# Patient Record
Sex: FEMALE | Race: WHITE | Employment: FULL TIME | URBAN - METROPOLITAN AREA
[De-identification: names, ages, dates, MRNs, and addresses within clinical notes are randomized per-mention and may not be internally consistent; named-entity substitution may affect disease eponyms.]

---

## 2017-06-13 ENCOUNTER — ANESTHESIA EVENT (OUTPATIENT)
Dept: SURGERY | Age: 27
End: 2017-06-13
Payer: SELF-PAY

## 2017-06-14 ENCOUNTER — HOSPITAL ENCOUNTER (OUTPATIENT)
Age: 27
Setting detail: OUTPATIENT SURGERY
Discharge: HOME | End: 2017-06-14
Attending: OTOLARYNGOLOGY | Admitting: OTOLARYNGOLOGY
Payer: SELF-PAY

## 2017-06-14 ENCOUNTER — ANESTHESIA (OUTPATIENT)
Dept: SURGERY | Age: 27
End: 2017-06-14
Payer: SELF-PAY

## 2017-06-14 VITALS
RESPIRATION RATE: 9 BRPM | HEART RATE: 78 BPM | TEMPERATURE: 97.3 F | BODY MASS INDEX: 19.56 KG/M2 | DIASTOLIC BLOOD PRESSURE: 84 MMHG | HEIGHT: 66 IN | SYSTOLIC BLOOD PRESSURE: 131 MMHG | OXYGEN SATURATION: 99 % | WEIGHT: 121.69 LBS

## 2017-06-14 LAB — HCG SERPL QL: NEGATIVE

## 2017-06-14 PROCEDURE — 76010000133 HC OR TIME 3 TO 3.5 HR: Performed by: OTOLARYNGOLOGY

## 2017-06-14 PROCEDURE — 77030002966 HC SUT PDS J&J -A: Performed by: OTOLARYNGOLOGY

## 2017-06-14 PROCEDURE — 76210000006 HC OR PH I REC 0.5 TO 1 HR: Performed by: OTOLARYNGOLOGY

## 2017-06-14 PROCEDURE — 76060000037 HC ANESTHESIA 3 TO 3.5 HR: Performed by: OTOLARYNGOLOGY

## 2017-06-14 PROCEDURE — 74011250636 HC RX REV CODE- 250/636: Performed by: ANESTHESIOLOGY

## 2017-06-14 PROCEDURE — 36415 COLL VENOUS BLD VENIPUNCTURE: CPT | Performed by: OTOLARYNGOLOGY

## 2017-06-14 PROCEDURE — 74011000250 HC RX REV CODE- 250

## 2017-06-14 PROCEDURE — 77030008771 HC TU NG SALEM SUMP -A: Performed by: ANESTHESIOLOGY

## 2017-06-14 PROCEDURE — 77030020782 HC GWN BAIR PAWS FLX 3M -B

## 2017-06-14 PROCEDURE — 74011250636 HC RX REV CODE- 250/636

## 2017-06-14 PROCEDURE — 77030018566 HC SUT PDS1 J&J -A: Performed by: OTOLARYNGOLOGY

## 2017-06-14 PROCEDURE — 77030026438 HC STYL ET INTUB CARD -A: Performed by: ANESTHESIOLOGY

## 2017-06-14 PROCEDURE — 77030008684 HC TU ET CUF COVD -B: Performed by: ANESTHESIOLOGY

## 2017-06-14 PROCEDURE — 74011250636 HC RX REV CODE- 250/636: Performed by: OTOLARYNGOLOGY

## 2017-06-14 PROCEDURE — 77030032490 HC SLV COMPR SCD KNE COVD -B: Performed by: OTOLARYNGOLOGY

## 2017-06-14 PROCEDURE — 84703 CHORIONIC GONADOTROPIN ASSAY: CPT | Performed by: OTOLARYNGOLOGY

## 2017-06-14 PROCEDURE — 77030002974 HC SUT PLN J&J -A: Performed by: OTOLARYNGOLOGY

## 2017-06-14 PROCEDURE — 76210000021 HC REC RM PH II 0.5 TO 1 HR: Performed by: OTOLARYNGOLOGY

## 2017-06-14 PROCEDURE — 74011000250 HC RX REV CODE- 250: Performed by: OTOLARYNGOLOGY

## 2017-06-14 PROCEDURE — 74011250637 HC RX REV CODE- 250/637: Performed by: OTOLARYNGOLOGY

## 2017-06-14 PROCEDURE — 77030020256 HC SOL INJ NACL 0.9%  500ML: Performed by: OTOLARYNGOLOGY

## 2017-06-14 RX ORDER — ALBUTEROL SULFATE 0.83 MG/ML
2.5 SOLUTION RESPIRATORY (INHALATION) AS NEEDED
Status: DISCONTINUED | OUTPATIENT
Start: 2017-06-14 | End: 2017-06-14 | Stop reason: HOSPADM

## 2017-06-14 RX ORDER — CEFAZOLIN SODIUM IN 0.9 % NACL 2 G/50 ML
2 INTRAVENOUS SOLUTION, PIGGYBACK (ML) INTRAVENOUS ONCE
Status: COMPLETED | OUTPATIENT
Start: 2017-06-14 | End: 2017-06-14

## 2017-06-14 RX ORDER — FENTANYL CITRATE 50 UG/ML
50 INJECTION, SOLUTION INTRAMUSCULAR; INTRAVENOUS AS NEEDED
Status: DISCONTINUED | OUTPATIENT
Start: 2017-06-14 | End: 2017-06-14 | Stop reason: HOSPADM

## 2017-06-14 RX ORDER — FENTANYL CITRATE 50 UG/ML
INJECTION, SOLUTION INTRAMUSCULAR; INTRAVENOUS AS NEEDED
Status: DISCONTINUED | OUTPATIENT
Start: 2017-06-14 | End: 2017-06-14 | Stop reason: HOSPADM

## 2017-06-14 RX ORDER — PROPOFOL 10 MG/ML
INJECTION, EMULSION INTRAVENOUS AS NEEDED
Status: DISCONTINUED | OUTPATIENT
Start: 2017-06-14 | End: 2017-06-14 | Stop reason: HOSPADM

## 2017-06-14 RX ORDER — MIDAZOLAM HYDROCHLORIDE 1 MG/ML
INJECTION, SOLUTION INTRAMUSCULAR; INTRAVENOUS AS NEEDED
Status: DISCONTINUED | OUTPATIENT
Start: 2017-06-14 | End: 2017-06-14 | Stop reason: HOSPADM

## 2017-06-14 RX ORDER — OXYMETAZOLINE HCL 0.05 %
SPRAY, NON-AEROSOL (ML) NASAL AS NEEDED
Status: DISCONTINUED | OUTPATIENT
Start: 2017-06-14 | End: 2017-06-14 | Stop reason: HOSPADM

## 2017-06-14 RX ORDER — ONDANSETRON 2 MG/ML
4 INJECTION INTRAMUSCULAR; INTRAVENOUS AS NEEDED
Status: DISCONTINUED | OUTPATIENT
Start: 2017-06-14 | End: 2017-06-14 | Stop reason: HOSPADM

## 2017-06-14 RX ORDER — SODIUM CHLORIDE, SODIUM LACTATE, POTASSIUM CHLORIDE, CALCIUM CHLORIDE 600; 310; 30; 20 MG/100ML; MG/100ML; MG/100ML; MG/100ML
150 INJECTION, SOLUTION INTRAVENOUS CONTINUOUS
Status: DISCONTINUED | OUTPATIENT
Start: 2017-06-14 | End: 2017-06-14 | Stop reason: HOSPADM

## 2017-06-14 RX ORDER — HYDROMORPHONE HYDROCHLORIDE 1 MG/ML
1 INJECTION, SOLUTION INTRAMUSCULAR; INTRAVENOUS; SUBCUTANEOUS
Status: DISCONTINUED | OUTPATIENT
Start: 2017-06-14 | End: 2017-06-14 | Stop reason: HOSPADM

## 2017-06-14 RX ORDER — DIPHENHYDRAMINE HYDROCHLORIDE 50 MG/ML
12.5 INJECTION, SOLUTION INTRAMUSCULAR; INTRAVENOUS AS NEEDED
Status: DISCONTINUED | OUTPATIENT
Start: 2017-06-14 | End: 2017-06-14 | Stop reason: HOSPADM

## 2017-06-14 RX ORDER — ROCURONIUM BROMIDE 10 MG/ML
INJECTION, SOLUTION INTRAVENOUS AS NEEDED
Status: DISCONTINUED | OUTPATIENT
Start: 2017-06-14 | End: 2017-06-14 | Stop reason: HOSPADM

## 2017-06-14 RX ORDER — GLUCOSAMINE/CHONDR SU A SOD 750-600 MG
1 TABLET ORAL
COMMUNITY
End: 2018-02-02

## 2017-06-14 RX ORDER — OXYMETAZOLINE HCL 0.05 %
2 SPRAY, NON-AEROSOL (ML) NASAL
Status: DISCONTINUED | OUTPATIENT
Start: 2017-06-14 | End: 2017-06-14 | Stop reason: HOSPADM

## 2017-06-14 RX ORDER — LIDOCAINE HYDROCHLORIDE 10 MG/ML
0.1 INJECTION, SOLUTION EPIDURAL; INFILTRATION; INTRACAUDAL; PERINEURAL AS NEEDED
Status: DISCONTINUED | OUTPATIENT
Start: 2017-06-14 | End: 2017-06-14 | Stop reason: HOSPADM

## 2017-06-14 RX ORDER — MIDAZOLAM HYDROCHLORIDE 1 MG/ML
1 INJECTION, SOLUTION INTRAMUSCULAR; INTRAVENOUS AS NEEDED
Status: DISCONTINUED | OUTPATIENT
Start: 2017-06-14 | End: 2017-06-14 | Stop reason: HOSPADM

## 2017-06-14 RX ORDER — ONDANSETRON 2 MG/ML
INJECTION INTRAMUSCULAR; INTRAVENOUS AS NEEDED
Status: DISCONTINUED | OUTPATIENT
Start: 2017-06-14 | End: 2017-06-14 | Stop reason: HOSPADM

## 2017-06-14 RX ORDER — DEXAMETHASONE SODIUM PHOSPHATE 4 MG/ML
INJECTION, SOLUTION INTRA-ARTICULAR; INTRALESIONAL; INTRAMUSCULAR; INTRAVENOUS; SOFT TISSUE AS NEEDED
Status: DISCONTINUED | OUTPATIENT
Start: 2017-06-14 | End: 2017-06-14 | Stop reason: HOSPADM

## 2017-06-14 RX ORDER — SODIUM CHLORIDE, SODIUM LACTATE, POTASSIUM CHLORIDE, CALCIUM CHLORIDE 600; 310; 30; 20 MG/100ML; MG/100ML; MG/100ML; MG/100ML
100 INJECTION, SOLUTION INTRAVENOUS CONTINUOUS
Status: DISCONTINUED | OUTPATIENT
Start: 2017-06-14 | End: 2017-06-14 | Stop reason: HOSPADM

## 2017-06-14 RX ORDER — BACITRACIN ZINC 500 UNIT/G
OINTMENT (GRAM) TOPICAL AS NEEDED
Status: DISCONTINUED | OUTPATIENT
Start: 2017-06-14 | End: 2017-06-14 | Stop reason: HOSPADM

## 2017-06-14 RX ORDER — LIDOCAINE HYDROCHLORIDE 20 MG/ML
INJECTION, SOLUTION EPIDURAL; INFILTRATION; INTRACAUDAL; PERINEURAL AS NEEDED
Status: DISCONTINUED | OUTPATIENT
Start: 2017-06-14 | End: 2017-06-14 | Stop reason: HOSPADM

## 2017-06-14 RX ADMIN — OXYMETAZOLINE HYDROCHLORIDE 2 SPRAY: 5 SPRAY NASAL at 11:25

## 2017-06-14 RX ADMIN — PROPOFOL 160 MG: 10 INJECTION, EMULSION INTRAVENOUS at 11:40

## 2017-06-14 RX ADMIN — FENTANYL CITRATE 100 MCG: 50 INJECTION, SOLUTION INTRAMUSCULAR; INTRAVENOUS at 11:50

## 2017-06-14 RX ADMIN — FENTANYL CITRATE 50 MCG: 50 INJECTION, SOLUTION INTRAMUSCULAR; INTRAVENOUS at 11:36

## 2017-06-14 RX ADMIN — OXYMETAZOLINE HYDROCHLORIDE 2 SPRAY: 5 SPRAY NASAL at 11:01

## 2017-06-14 RX ADMIN — SODIUM CHLORIDE, SODIUM LACTATE, POTASSIUM CHLORIDE, AND CALCIUM CHLORIDE 150 ML/HR: 600; 310; 30; 20 INJECTION, SOLUTION INTRAVENOUS at 10:27

## 2017-06-14 RX ADMIN — CEFAZOLIN 2 G: 1 INJECTION, POWDER, FOR SOLUTION INTRAMUSCULAR; INTRAVENOUS; PARENTERAL at 11:37

## 2017-06-14 RX ADMIN — PROPOFOL 40 MG: 10 INJECTION, EMULSION INTRAVENOUS at 14:00

## 2017-06-14 RX ADMIN — LIDOCAINE HYDROCHLORIDE 60 MG: 20 INJECTION, SOLUTION EPIDURAL; INFILTRATION; INTRACAUDAL; PERINEURAL at 11:40

## 2017-06-14 RX ADMIN — DEXAMETHASONE SODIUM PHOSPHATE 8 MG: 4 INJECTION, SOLUTION INTRA-ARTICULAR; INTRALESIONAL; INTRAMUSCULAR; INTRAVENOUS; SOFT TISSUE at 11:51

## 2017-06-14 RX ADMIN — MIDAZOLAM HYDROCHLORIDE 2 MG: 1 INJECTION, SOLUTION INTRAMUSCULAR; INTRAVENOUS at 11:36

## 2017-06-14 RX ADMIN — ONDANSETRON 4 MG: 2 INJECTION INTRAMUSCULAR; INTRAVENOUS at 11:51

## 2017-06-14 RX ADMIN — SODIUM CHLORIDE, SODIUM LACTATE, POTASSIUM CHLORIDE, AND CALCIUM CHLORIDE: 600; 310; 30; 20 INJECTION, SOLUTION INTRAVENOUS at 13:37

## 2017-06-14 RX ADMIN — FENTANYL CITRATE 50 MCG: 50 INJECTION, SOLUTION INTRAMUSCULAR; INTRAVENOUS at 11:40

## 2017-06-14 RX ADMIN — ROCURONIUM BROMIDE 35 MG: 10 INJECTION, SOLUTION INTRAVENOUS at 11:41

## 2017-06-14 NOTE — OP NOTES
Alvino Moreno [facility] [place] [facility]   [address] [facility], [address]   OP NOTE       Name:  Aide Holden   MR#:  [id number]   :  1990   Account #:  [de-identified]    Surgery Date:  2017   Date of Adm:  2017       PREOPERATIVE DIAGNOSIS: Nasal disproportion. POSTOPERATIVE DIAGNOSIS: Nasal disproportion. PROCEDURES PERFORMED: Endonasal Cosmetic rhinoplasty. ANESTHESIA: General.    COMPLICATIONS: None. ESTIMATED BLOOD LOSS: Minimal.    SPECIMENS: None. FINDINGS: There was a large dorsal hump composed of cartilage and   bone. There was over projection of the tip as well. The tip was slightly   under rotated in terms of displaying a lack of double break on profile. There was a prominent soft tissue between the medial crural foot   plates, which included the depressor septi muscle, which was   overactive as noted in the clinic. INDICATIONS FOR PROCEDURE: The patient is a 57-year-old   female who does not like the cosmetic appearance of her nose. She   has no history of nasal surgery. She denies any functional complaints. DESCRIPTION OF PROCEDURE: After informed consent, the patient   was taken to the operating room and placed on the table in supine   position. After general anesthesia, the table was turned 180 degrees. The nose was packed with Afrin pledgets and injected with 1%   lidocaine with 1:100,000 epinephrine. The patient was prepped and   draped in standard fashion. Initially, a left hemitransfixion incision was   made with a 15 blade, and a mucoperichondrial flap was elevated   posteriorly past the bony cartilaginous junction. Cartilage for grafting   purposes was harvested, leaving at least a 1.5-2 cm dorsal and caudal   strut. This cartilage was excised for graft by  from the   maxillary crest and the bony cartilaginous junction. It was saved in   antibiotic saline for later use.  The medial crural foot plates were   splayed in this case and so a small incision over the right medial crural   footplate was made, and then the foot plate was dissected out and the   inferior 5-mm of the foot plate was excised. Through the transfixion   incision, then a retrograde dissection was performed to access the foot   plate and a similar foot plate excision was performed. Through this   same incision then dissection proceeded down to the anterior nasal   spine and all the intervening soft tissue between the medial crural foot   plate was excised and this included a significant amount of depressor   septi muscle, extending below the anterior nasal spine. There was   slight oozing from the muscle, which was cauterized with bipolar   cautery, which was also done to help to deactivate the muscle   somewhat. At this point, a left intracartilaginous incision was made with   a 15 blade, and a bloodless plane was developed over the dorsum   extending out to the bony cartilaginous junction, where the Toombs   elevator was used to elevate the periosteum in the midline. Prior to   taking the dorsal hump down, the caudal elevator was introduced at   the anterior septal angle to create tunnels for  graft and this   also helped to prevent injury to the mucosa beneath the upper laterals   during hump takedown. This was done on both sides and done with a   caudal elevator. The cartilaginous hump was then started with a 15   blade and then at the bony cartilaginous junction, the Cinelli osteotome   was placed at the bony cartilaginous junction and then the bony portion   of the hump was taken off in continuity with the cartilaginous portion. This was then removed and saved for potential later use. The   remainder the bony hump was taken down with the eder carbide   rasp and the cartilage was trimmed as needed to make it a smooth all   the way to the supratip.  The anterior septal angle was excised in a   triangular fashion and for about 3 mm to effect some slight rotation of   the tip. The posterior septum was not trimmed.  grafts were   carved from harvested cartilage and they were placed into the pockets,   extending under the nasal bone on both sides, and were secured   anteriorly with 5-0 PDS sutures. The upper lateral cartilages were then   resuspended to the  grafts with 5-0 plain sutures. Note that   prior to placing the  grafts, lateral osteotomies were performed   through stab incisions at the piriform aperture on both sides and these   were high-low-high osteotomies done to close the bony open roof   deformity from hump takedown. Because of the large size of this   hump, there were some irregularities in the bone, mostly the bony   cartilaginous junction. To remedy this, some very soft crushed cartilage   grafts were placed in various locations to make sure that palpation of   the bridge was smooth as possible. There was a slight depression of   the supratip as well and so a small crushed cartilage graft was placed   in this region also. Note that had some deprojection was achieved by   the dissection between the medial crural foot plates and  the   medial crural foot plates from the septum and dissecting inferior   and posterior to the anterior nasal spine. This actually made the   hump slightly larger and required slightly more excision of hump than   would have been otherwise. Judging with the new tip position   then, again, a small amount of crushed cartilage was placed in the   supratip to make the transition from the tip to the dorsum smooth. Note   that the dorsal pocket was irrigated with bacitracin saline prior to   placing the grafts and then, at this point, being satisfied with   smoothness of the dorsum and the height of the dorsum, the   intercartilaginous incision was closed with 5-0 plain sutures. A tip graft   access incision was made then under the dome on the right side as an   infracartilaginous incision. Scissors were used to develop a plane   immediately above the domes leaving all soft tissue on the flap. Two   crushed cartilage cap grafts were placed to help define the double   break and to provide projection beyond the anterior septal angle. These were manipulated into position and the contour was found to be   nice. The incision was then closed with 5-0 plain sutures. The septal   pocket was then irrigated with bacitracin saline. Any remaining   cartilage was crushed and placed back between the septal flaps to   reskeletonize the septum. The transfixion incision was closed with 5-0   plain sutures. Note that the right foot plate excision site was closed   with 5-0 plain sutures. The lateral osteotomy sites were closed with 5-0   plain sutures as well. A running mattress 5-0 plain was placed to   reappose the septal flaps. A 4-0 plain suture on a Jeremy needle was   then used to bring the medial crural foot plate remnants together   slightly and this was somewhat of a septal columellar suture to   stabilize the slightly deep projected position of the tip. This also   narrowed the space that was created by excision of soft tissue   between the medial crural foot plates and by excision of the medial   crural footplates. This improved the nostril shape as well as the nostrils   were slightly narrow because of the medial crural footplates and a   prominent soft tissue between the medial crural foot plates. The nose   was suctioned to the nasopharynx and irrigated with antibiotic saline. No bleeding was seen. The nasal valves were widely patent at this   point as seen from inside the nose. Pledgets were placed on both   sides and the strings were taped to the face. An external cast   composed of brown tape and a metal splint was placed in the usual   fashion. A drip pad was placed under the nose. An orogastric tube was   placed to the stomach to remove any blood or secretions, and then   removed.  This concluded the procedure. The patient was awakened   from anesthesia, extubated, and taken to the PACU in stable condition. There were no complications. The patient tolerated the procedure well.         MD Candie Ashford / Leif Whelan   D:  06/14/2017   15:48   T:  06/14/2017   16:50   Job #:  [id number]

## 2017-06-14 NOTE — ANESTHESIA POSTPROCEDURE EVALUATION
Post-Anesthesia Evaluation and Assessment    Patient: Lila Cannon MRN: [id number]  SSN: [id number]    YOB: 1990  Age: 27 y.o. Sex: female       Cardiovascular Function/Vital Signs  Visit Vitals    /83    Pulse 84    Temp 36.3 °C (97.3 °F)    Resp 12    Ht 5' 6\" (1.676 m)    Wt 55.2 kg (121 lb 11.1 oz)    SpO2 98%    BMI 19.64 kg/m2       Patient is status post general anesthesia for Procedure(s):  COSMETIC RHINOPLASTY. Nausea/Vomiting: None    Postoperative hydration reviewed and adequate. Pain:  Pain Scale 1: Numeric (0 - 10) (06/14/17 1506)  Pain Intensity 1: 0 (06/14/17 1506)   Managed    Neurological Status:   Neuro (WDL): Exceptions to WDL (06/14/17 1506)  Neuro  Neurologic State: Drowsy; Eyes open spontaneously (06/14/17 1506)  LUE Motor Response: Purposeful;Spontaneous  (06/14/17 1506)  LLE Motor Response: Purposeful;Spontaneous  (06/14/17 1506)  RUE Motor Response: Purposeful;Spontaneous  (06/14/17 1506)  RLE Motor Response: Purposeful;Spontaneous  (06/14/17 1506)   At baseline    Mental Status and Level of Consciousness: Arousable    Pulmonary Status:   O2 Device: Blow by oxygen (06/14/17 1510)   Adequate oxygenation and airway patent    Complications related to anesthesia: None    Post-anesthesia assessment completed.  No concerns    Signed By: Jermaine Maya MD     June 14, 2017

## 2017-06-14 NOTE — IP AVS SNAPSHOT
[address] [facility], [place], operated by [facility] 104 [address] [facility] 
[phone] Patient: Madhuri Alvarenga MRN: [id number] IDL:[id number] You are allergic to the following No active allergies Recent Documentation Height Weight BMI OB Status Smoking Status 1.676 m 55.2 kg 19.64 kg/m2 Having regular periods Never Smoker Emergency Contacts Name Discharge Info Relation Home Work Mobile Na Yolanda 540 CAREGIVER [3] Spouse [3] About your hospitalization You were admitted on:  June 14, 2017 You last received care in the:  [facility] PACU You were discharged on:  June 14, 2017 Unit phone number:  [phone] Why you were hospitalized Your primary diagnosis was:  Not on File Providers Seen During Your Hospitalizations Provider Role Specialty Primary office phone Shanel Rice MD Attending Provider Otolaryngology [phone] Your Primary Care Physician (PCP) Primary Care Physician Office Phone Office Fax NONE ** None ** ** None ** Follow-up Information Follow up With Details Comments Contact Info None   None ([phone]) Patient stated that they have no PCP Shanel Rice MD Go in 1 day Has appointment Thursday 6/14 [address] [facility] [address] [facility] 
[phone] Current Discharge Medication List  
  
CONTINUE these medications which have NOT CHANGED Dose & Instructions Dispensing Information Comments Morning Noon Evening Bedtime Biotin 2,500 mcg Cap Your last dose was: Your next dose is:    
   
   
 Dose:  1 Cap Take 1 Cap by mouth. Refills:  0  
     
   
   
   
  
 multivit-mineral-iron-lutein Tab tablet Commonly known as:  WOMEN'S CENTRUM SILVER WITH LUTEIN Your last dose was: Your next dose is:    
   
   
 Dose:  1 Tab Take 1 Tab by mouth daily. Refills:  0 Discharge Instructions Patient Discharge Instructions Greg Orellana / [id number] : 1990 Admitted 2017 Discharged: 2017 Patient Discharge Instructions - Rhinoplasty The following instructions have been designed to answer practically every question that might arise regarding the \"do's\" and \"don'ts\" after surgery. You and your family should read these several times to become familiar with them. Follow them faithfully, because those who do generally have the smoothest postoperative course. SWELLING Every operation, no matter how minor, is accompanied by swelling of the surrounding tissues. The amount varies from person to person. The swelling itself is not serious and is to be expected after your surgery. It sometimes is worse on the second postoperative day than it was on the first, and in the mornings. Remember is that swelling will always subside eventually. You can help decrease the swelling in the following ways: 1. Sleep with your head elevated until all of the dressings have been removed from the nose. Use an additional pillow or two under the mattress, if necessary. 2.    Stay up (sitting, standing, walking about) as much as possible after your return home. THIS IS IMPORTANT. Of course, you should rest when you tire. 3.    Avoid bending over or lifting heavy things for one week. Besides aggravating the swelling, this may raise your blood pressure and start a hemorrhage. 4.    Avoid hitting or bumping your new nose. It is wise not to  small children. 5.    Avoid excessive sunning of the face during the first month after your operation. A sunscreen is always advisable, but a total sunblock is suggested for the first month. 6.    Do not tweeze your eyebrows for one week.  
7.    Avoid \"sniffing\", that is, constantly attempting to pull air through the nose as some people do when their nose feels blocked. This will not relieve the sensation of blockage - it will only aggravate it because the suction created on the inside will cause more swelling. 8.    Avoid rubbing the nostrils and the base of the nose with Kleenex or a handkerchief. Not only will this aggravate the swelling, but also it may cause infection, bleeding, or the accumulation of scar tissue inside the nose. Use the \"moustache\" gauze dressing instead if discharge is excessive. DISCOLORATION It is not unusual to have varying amounts of discoloration about the face. Like swelling, the discoloration may become more pronounced after you have been discharged. It usually lasts not more than a week or two, all the while decreasing in intensity. If the nasal bones were not reshaped, there is usually very little bruising. The measures which will help your swelling subside will also be working to decrease the amount of discoloration. You can camouflage the discoloration to some extent by using a thick makeup base. NUMBNESS After surgery you will notice that the tip of your nose feels firm, and it is not uncommon for the nose to feel numb for a short time. If you have incisions inside your nose, you may be able to feel minor irregularities in its surface until all swelling disappears. NASAL PACKING AND BLEEDING It is not uncommon to soak several gauze pads (your moustache dressing) during the first several hours after surgery. The frequency with which these are changed should decrease. If it does not, go to bed, apply ice compresses about the face, and report it to the office by telephone. You may be told to return to the office or hospital. 
 
Change the drip pad as needed using 4x4 gauze and tape.  
 
Whenever the nasal passages are blocked, such as when you have a cold or an allergy, the nasal glands produce more mucous than normal.  Your nose is blocked from the postoperative swelling, so you can expect an increase in mucous drainage for several days. It will be blood tinged and should cause you no concern unless the drainage looks like pure blood and flows freely, as when you cut a finger. If this happens, please call the office immediately. DO NOT attempt to remove blood clots or anything else from the nostrils. If a turbinate resection was part of your nasal procedure, bleeding can occur from this area for up to six weeks after your surgery. Be diligent in using the nasal drops and ointment. This helps the healing process and the dissolving of the crusts that form on the turbinates. Your exercise regimen will be curtailed at least to some extent for the first few weeks following surgery. Upper body exercise is especially prohibited, as it is more likely to cause turbinate bleeding. PAIN There is usually little actual pain following nasal surgery, but you may experience a deep bruised sensation as a result of the postoperative swelling that occurs. As is usually the case with such things, this seems worse at night and when one becomes nervous. The usually prescribed drugs which minimize pain often cause sensations of light-headedness, particularly in the immediate postoperative period. This may seem to make your recovery more tedious. Please take the pain medicine as needed. Do not try to \"tough it out\" if you are uncomfortable. DO NOT take aspirin, ibuprofen, or any other NSAIDs (Non-Steroidal Anti-Inflammatory Drugs). NAUSEA Sometimes the anesthesia, the pain pills, or swallowed blood will make you nauseated. You will receive a prescription for anti-nausea medication to use if needed. DEPRESSION It is not unusual for an individual to go through a period of mild depression twelve to thirty-six hours after surgery.   Even though you very much want this surgery, and even though we have tried to tell you what to expect postoperatively, you may be somewhat shocked at seeing your own face swollen and bruised. This is a very temporary condition which will subside shortly. The best \"treatment\" is to busy yourself with the details of your postoperative care and try to remember that the recovery period will soon be over. INSOMNIA You may experience some difficulty falling asleep. For this we have prescribed a sleeping pill. If you must take one, remember that such drugs make some people feel light-headed and weak. NOSE DROPS Salt water nose drops and an antibiotic ointment are prescribed to keep the nose moist and open. This will prevent any crusts from forming. KEEPING A STIFF UPPER LIP The upper lip is important in nasal surgery, as much work is done in this area. To keep the healing tissues from being disturbed, do not move your upper lip for as long as the bandage is in place. Avoid excessive smiling. Avoid pursing the lips such as in kissing for ten days. Do not pull the upper lip down as women do when applying lipstick. Apply lipstick with a brush. The upper teeth should be cleaned with toothpaste on a face cloth. The lower teeth may be brushed as usual. 
 
Avoid gum or foods that are hard to chew. Soups, mashed potatoes, stewed chicken, hamburger steak, or any easily chewed food is permissible. After the first few days, if you have chosen to continue wearing a moustache dressing because of the excessive mucous drainage, do not change it more than twice daily. Changing the dressing has a tendency to move the upper lip and the base of the nose. The fact that these dressings have become soiled does not hurt anything except, perhaps, one's sensibilities. You should be able to put up with this for a few days in a good cause.   Incidentally, if the dressing becomes stuck, it may be loosened with a few drops of peroxide, however do not use peroxide in the inside of the nose. Also, the best type of adhesive tape to use is Micropore paper tape, because it is less irritating to the skin. It is readily available in most drug stores. CLEANING THE NOSE Don't blow the nose at all for ten days. After that, blow through both sides at once. Do not compress one side. You may clean the outside of the nose and the upper lip with cotton tipped applicators (Q-tips) moistened with warm water. You can do this as soon as you return from the hospital, but do not rub the nose very hard. The outside and near inside of the nostrils may need to be cleaned with a Q-tip moistened with warm water or hydrogen peroxide if crusting is present. One of the antibiotic ointments that you will be given, usually Bacitracin, should be applied to the inside of the nose with a Q-tip. Twist the Q-tip around inside gently; you can go in about an inch or until you feel any resistance. This will help prevent crusting and help you to breathe better. This should be done at least 3 to 4 times a day. You were also given a saline spray. Lie on the bed, hang your head over the side, and drop in 6-10 drops in each side. Do this before you put the ointment in 3 to 4 times a day. Soon after the bandage has been removed, the skin on the nose should be cleaned gently in your usual manner twice a day, to remove the oily material that is produced by the skin glands. This will also assist in the reduction of swelling. DRYNESS OF THE LIPS If your lips become dry from breathing through your mouth, coat them with Vaseline or lipstick. A vaporizer with plain water by the bedside at night might be a helpful addition. TEMPERATURE Generally, the body temperature does not rise much above 100 degrees following nasal surgery. This rise usually occurs if the patient becomes slightly dehydrated because he does not drink enough water.   Take your temperature either rectally or in the armpit. The rectal temperature is one degree above the oral temperature, and the armpit temperature is one degree below the oral temperature. Report any persistent temperature above 100 degrees. WEAKNESS It is not unusual for a person who has had an anesthetic or any type of operation to feel weak, have palpitations, break out in \"cold sweats\", or get dizzy. This gradually clears up in a few days without medication. MEDICATION Our office will usually give you all of your prescriptions the day before or the day of your surgery. Almost all patients will be given an antibiotic to be taken after surgery. Obtain explicit instructions about this medicine from the nurse. Multivitamins with vitamin C are suggested for the pre- and postoperative periods, and can be obtained by you without a prescription. We strongly discourage you from taking any Vitamin E preparations prior to or after surgery. These may increase the probability of bleeding. If you develop a rash or other reaction while you are taking one of the medicines, this could mean that you are developing an allergy to the medicine. If this occurs, please stop taking your medications and call the office immediately. YOUR FIRST POSTOPERATIVE OFFICE VISIT The appointment for your first postop visit should be made prior to surgery. This appointment will most probably be for the day after surgery, particularly if your surgery was performed on an outpatient basis. It is generally on the first postoperative day that the minimal nasal packing that was placed is removed. It is normal to have a small amount of bloody oozing after this. It is important that you try to eat or drink something before coming into the office the day after surgery. Ideally, this should be high in calories and protein, such as milk and cookies. If you don't feel up to this, at least a soft drink with high sugar content is advisable. POSTOPERATIVE CARE Following your surgery, we will want to see you in the office at regularly scheduled intervals to monitor your progress. RESUMING ACTIVITIES The head of your bed will no longer need to be elevated after the splint has been removed. While the bandage is in place, don't wear any pull over clothing. NO STRENUOUS ATHLETIC ACTIVITY FOR ONE MONTH, including swimming, jogging, aerobics, etc.  No diving or water skiing for two months. No contact sports for four months. Avoid sneezing until the bandage is removed. If you must sneeze, let it come out like a cough - through the mouth. If it becomes a real problem, we will prescribe medication to alleviate the condition. Eyeglasses may be worn as long as the metal splint remains on the nose. After the splint is removed, glasses  must be suspended from the forehead for a period of about six weeks. If this is not done, the pressure of your glasses may change the contour of your nose. Your glasses can be suspended from the nose after your splint is removed in three ways. One way is to use a piece of tape to hold the glasses on your forehead so that the weight is off your nose. Another alternative is a \"noseguard\". You can also have prescription lenses made for special frames that fit comfortably around your head. This is the most costly option, and involves seeing an optometrist. We can provide you with further information about any of these alternatives, and our nurses will be happy to discuss these with you to help you choose the option that best fits your needs. Contact lenses may be worn the day after surgery. RETURNING TO WORK OR SCHOOL The average patient is able to return to school the day after the bandage is removed. That will be about five to six days after your surgery. Some hardy souls have returned earlier.  
 
Returning to work depends on several factors: the amount of physical activity involved in your position, the amount of public contact your job requires, and the amount of swelling and discoloration that you may develop. On the average, you may return to work on the 8th to 10th postoperative day. INJURY TO THE NOSE Some individuals sustain accidents during the early postoperative period. You need not be too concerned unless the blow is hard enough to cause significant bleeding, swelling or pain. If a blow is sustained while the metal splint is still on, this should help protect the nose. However, for the first five weeks after the nasal splint is removed, more attention should be paid to any injury to the nose. Blows to the nose can cause the nasal bones to become deviated. Please report any accident to the office immediately if you feel it was a significant bump. Otherwise, let us know about it at your next visit. FINALLY Remember the things you were told before your operation: When the bandage is first removed, your nose may appear fat and turned up too much. This is caused by the operative swelling over the nose and in the upper lip. The swelling will subside to a great extent during the next week. However, remember that it will take up to one or two years for all the swelling to disappear and for your nose to reach its final contour. The discoloration will gradually disappear over a period of seven to ten days, in most cases. The thicker and oilier the skin, the longer it takes for the swelling to subside. The upper lip may seem stiff for some time after surgery, and you may feel that this interferes with your smile. Be patient. This will disappear within a few weeks. The tip of the nose sometimes feels numb after nasal surgery. This will eventually disappear. Occasionally, the upper teeth will have tingling if extensive septal work was necessary. This, too, will resolve with time. Follow-up with Marck Zuniga MD tomorrow as scheduled. If you have any questions, please call us at [phone]. We are always happy to answer your questions, and if you should have a problem, this number is answered 24 hours a day. DISCHARGE SUMMARY from your Nurse The following personal items collected during your admission are returned to you:  
Dental Appliance: Dental Appliances: None Vision: Visual Aid: None Hearing Aid:   
Jewelry: Jewelry: Other (comment), Earrings (to spouse) Clothing: Clothing:  (clothing to locker) Other Valuables: Other Valuables: None Valuables sent to safe:   
 
PATIENT INSTRUCTIONS: 
 
After general anesthesia or intravenous sedation, for 24 hours or while taking prescription Narcotics: · Limit your activities · Do not drive and operate hazardous machinery · Do not make important personal or business decisions · Do  not drink alcoholic beverages · If you have not urinated within 8 hours after discharge, please contact your surgeon on call. Report the following to your surgeon: 
· Excessive pain, swelling, redness or odor of or around the surgical area · Temperature over 100.5 · Nausea and vomiting lasting longer than 4 hours or if unable to take medications · Any signs of decreased circulation or nerve impairment to extremity: change in color, persistent  numbness, tingling, coldness or increase pain · Any questions [address] Breathing deep and coughing are very important exercises to do after surgery. Deep breathing and coughing open the little air tubes and air sacks in your lungs. You take deep breaths every day. You may not even notice - it is just something you do when you sigh or yawn. It is a natural exercise you do to keep these air passages open. After surgery, take deep breaths and cough, on purpose. Coughing and deep breathing help prevent bronchitis and pneumonia after surgery.   If you had chest or belly surgery, use a pillow as a \"hug buddy\" and hold it tightly to your chest or belly when you cough. DIRECTIONS: 
6. Take 10 to 15 slow deep breaths every hour while awake. 7. Breathe in deeply, and hold it for 2 seconds. 8. Exhale slowly through puckered lips, like blowing up a balloon. 9. After every 4th or 5th deep breath, hug your pillow to your chest or belly and give a hard, deep cough. Yes, it will probably hurt. But doing this exercise is very important part of healing after surgery. Take your pain medicine to help you do this exercise without too much pain. IF YOU HAVE BEEN DIAGNOSED WITH SLEEP APNEA, PLEASE USE YOUR SLEEP APNEA DEVICE OR CPAP MACHINE WHEN YOU INTEND TO NAP AFTER TAKING PAIN MEDICATION. Ankle Pumps Ankle pumps increase the circulation of oxygenated blood to your lower extremities and decrease your risk for circulation problems such as blood clots. They also stretch the muscles, tendons and ligaments in your foot and ankle, and prevent joint contracture in the ankle and foot, especially after surgeries on the legs. It is important to do ankle pump exercises regularly after surgery because immobility increases your risk for developing a blood clot. Your doctor may also have you take an Aspirin for the next few days as well. If your doctor did not ask you to take an Aspirin, consult with him before starting Aspirin therapy on your own. Slowly point your foot forward, feeling the muscles on the top of your lower leg stretch, and hold this position for 5 seconds. Next, pull your foot back toward you as far as possible, stretching the calf muscles, and hold that position for 5 seconds. Repeat with the other foot. Perform 10 repetitions every hour while awake for both ankles if possible (down and then up with the foot once is one repetition).  
 
You should feel gentle stretching of the muscles in your lower leg when doing this exercise. If you feel pain, or your range of motion is limited, don't  Push too hard. Only go the limit your joint and muscles will let you go. If you have increasing pain, progressively worsening leg warmth or swelling, STOP the exercise and call your doctor. These are general instructions for a healthy lifestyle: *  Please give a list of your current medications to your Primary Care Provider. *  Please update this list whenever your medications are discontinued, doses are 
    changed, or new medications (including over-the-counter products) are added. *  Please carry medication information at all times in case of emergency situations. About Smoking No smoking / No tobacco products / Avoid exposure to second hand smoke Surgeon General's Warning:  Quitting smoking now greatly reduces serious risk to your health. Obesity, smoking, and sedentary lifestyle greatly increases your risk for illness and disease. A healthy diet, regular physical exercise & weight monitoring are important for maintaining a healthy lifestyle. Congestive Heart Failure You may be retaining fluid if you have a history of heart failure or if you experience any of the following symptoms:  Weight gain of 3 pounds or more overnight or 5 pounds in a week, increased swelling in our hands or feet or shortness of breath while lying flat in bed. Please call your doctor as soon as you notice any of these symptoms; do not wait until your next office visit. Recognize signs and symptoms of STROKE: 
F - face looks uneven A - arms unable to move or move even S - speech slurred or non-existent T - time-call 911 as soon as signs and symptoms begin-DO NOT go Back to bed or wait to see if you get better-TIME IS BRAIN. Warning signs of HEART ATTACK Call 911 if you have these symptoms · Chest discomfort.   Most heart attacks involve discomfort in the center of the chest that lasts more than a few minutes, or that goes away and comes back. It can feel like uncomfortable pressure, squeezing, fullness, or pain. · Discomfort in other areas of the upper body. Symptoms can include pain or discomfort in one or both Arms, the back, neck, jaw, or stomach. ·  Shortness of breath with or without chest discomfort. · Other signs may include breaking out in a cold sweat, nausea, or lightheadedness Don't wait more than five minutes to call 911 - MINUTES MATTER! Fast action can save your life. Calling 911 is almost always the fastest way to get lifesaving treatment. Emergency Medical Services staff can begin treatment when they arrive - up to an hour sooner than if someone gets to the hospital by car. Discharge Orders None Introducing [facility]! Angie Cleverly introduces [employer] patient portal. Now you can access parts of your medical record, email your doctor's office, and request medication refills online. 1. In your internet browser, go to [email or web address] 2. Click on the First Time User? Click Here link in the Sign In box. You will see the New Member Sign Up page. 3. Enter your [employer] Access Code exactly as it appears below. You will not need to use this code after youve completed the sign-up process. If you do not sign up before the expiration date, you must request a new code. · [employer] Access Code: [email or web address] Expires: 9/7/2017  4:43 PM 
 
4. Enter the last four digits of your Social Security Number (xxxx) and Date of Birth (mm/dd/yyyy) as indicated and click Submit. You will be taken to the next sign-up page. 5. Create a StayT[employer] ID. This will be your [employer] login ID and cannot be changed, so think of one that is secure and easy to remember. 6. Create a StayT[employer] password. You can change your password at any time. 7. Enter your Password Reset Question and Answer.  This can be used at a later time if you forget your password. 8. Enter your e-mail address. You will receive e-mail notification when new information is available in [address] 9. Click Sign Up. You can now view and download portions of your medical record. 10. Click the Download Summary menu link to download a portable copy of your medical information. If you have questions, please visit the Frequently Asked Questions section of the [employer] website. Remember, [employer] is NOT to be used for urgent needs. For medical emergencies, dial 911. Now available from your iPhone and Android! General Information Please provide this summary of care documentation to your next provider. Patient Signature:  ____________________________________________________________ Date:  ____________________________________________________________  
  
[facility] Provider Signature:  ____________________________________________________________ Date:  ____________________________________________________________

## 2017-06-14 NOTE — H&P
Otolaryngoglogy, Head and Neck Surgery    Chief Complaint:    History of Present Illness:   Patient is a 27 y.o. female who is being seen for rhinoplasty. C/o dorsal hump and tip drooping when smiling. History reviewed. No pertinent past medical history. History reviewed. No pertinent family history. Social History   Substance Use Topics    Smoking status: Never Smoker    Smokeless tobacco: Never Used    Alcohol use Yes      Comment: occasionally     Past Surgical History:   Procedure Laterality Date    HX HEENT      wisdom teeth extraction      Current Facility-Administered Medications   Medication Dose Route Frequency    lidocaine (PF) (XYLOCAINE) 10 mg/mL (1 %) injection 0.1 mL  0.1 mL SubCUTAneous PRN    lactated ringers infusion  150 mL/hr IntraVENous CONTINUOUS    fentaNYL citrate (PF) injection 50 mcg  50 mcg IntraVENous PRN    midazolam (VERSED) injection 1 mg  1 mg IntraVENous PRN    midazolam (VERSED) injection 1 mg  1 mg IntraVENous PRN    ceFAZolin in 0.9% NS (ANCEF) IVPB soln 2 g  2 g IntraVENous ONCE    oxymetazoline (AFRIN) 0.05 % nasal spray 2 Spray  2 Spray Both Nostrils BID PRN      No Known Allergies     Review of Systems:  Pertinent items are noted in the History of Present Illness. Objective:     Patient Vitals for the past 8 hrs:   BP Temp Pulse Resp SpO2 Height Weight   17 0953 121/75 98.6 °F (37 °C) 91 12 100 % - -   17 0950 - - - - - 5' 6\" (1.676 m) 55.2 kg (121 lb 11.1 oz)     Temp (24hrs), Av.6 °F (37 °C), Min:98.6 °F (37 °C), Max:98.6 °F (37 °C)         PHYSICAL EXAM:    CONSTITUTIONAL:  Well nourished individual in no acute distress. The patient is able to communicate with normal voice quality. HEAD AND NECK EXAM:    HEAD & FACE: No head or facial abnormalities present. No masses or lesions present. Overall appearance is normal. Facial strength is normal.  EYES: Conjunctiva normal.  No abnormalities of the lids or globes.  Extraocular movements intact and conjugate. EARS: No significant abnormality of the external ear. NOSE: No significant external nasal lesions. No turbinate hypertrophy or mucosal lesions. No polyps, masses or purulence seen anteriorly. No edema. No significant septal deviation. Dorsal hump. SINUSES: The paranasal sinus regions are non-tender to palpation. ORAL CAVITY/OROPHARYNX: Lips and gums are without lesions. Oral cavity and oropharynx are without mucosal lesions or abnormalities. Tonsillar fossae, palate, tongue and uvula without abnormality. No edema. No erythema. NECK: No palpable lymphadenopathy or other masses in the neck. The trachea and larynx are midline. No thyroid enlargement or nodules appreciated. No abnormality of the parotid or submandibular glands present. LYMPHATIC: No lymphadenopathy in the neck/head. CHEST:  CTA  HEART:  RRR  NEUROLOGIC/PSYCHIATRIC:    NEUROLOGIC:  Cranial nerves 3, 4, 5, 6, 7, 10 (soft palate elevation), 11 and 12 bilaterally intact and symmetric. ORIENTATION/MOOD/AFFECT: Oriented to person, place, time and general circumstances. Mood and affect appropriate. Assessment:     Nasal disproportion    Plan: Will proceed with cosmetic rhinoplasty. Consent signed. Questions answered. Signed By: Jean Tellez MD     June 14, 2017       Isaura Saavedra MD  [facility], [facility] OF [employer] [facility]  [email or web address]  [address] [place] [address].  [address] [facility], [address] [facility]  Ph:  [phone]  Fax: [phone]

## 2017-06-14 NOTE — BRIEF OP NOTE
BRIEF OPERATIVE NOTE    Date of Procedure: 6/14/2017   Preoperative Diagnosis: COSMETIC  Postoperative Diagnosis: COSMETIC    Procedure(s):  COSMETIC RHINOPLASTY  Surgeon(s) and Role:     * Stephen Lowry MD - Primary         Assistant Staff:       Surgical Staff:  Circ-1: Lee Cam RN  Circ-Relief: Hawa Cooper RN  Scrub Tech-1: Sabas Collado  Scrub Tech-Relief: Prakash Herirng  Surg Asst-1: Roge Brown RN  Event Time In   Incision Start 1202   Incision Close 1500     Anesthesia: General   Estimated Blood Loss: min  Specimens: * No specimens in log *   Findings: large dorsal hump   Complications: none  Implants: * No implants in log *

## 2017-06-14 NOTE — DISCHARGE INSTRUCTIONS
Patient Discharge Instructions    Collin Zaman / [id number] : 1990    Admitted 2017 Discharged: 2017            Patient Discharge Instructions - Rhinoplasty    The following instructions have been designed to answer practically every question that might arise regarding the \"do's\" and \"don'ts\" after surgery. You and your family should read these several times to become familiar with them. Follow them faithfully, because those who do generally have the smoothest postoperative course. SWELLING  Every operation, no matter how minor, is accompanied by swelling of the surrounding tissues. The amount varies from person to person. The swelling itself is not serious and is to be expected after your surgery. It sometimes is worse on the second postoperative day than it was on the first, and in the mornings. Remember is that swelling will always subside eventually. You can help decrease the swelling in the following ways:    1. Sleep with your head elevated until all of the dressings have been removed from the nose. Use an additional pillow or two under the mattress, if necessary. 2.    Stay up (sitting, standing, walking about) as much as possible after your return home. THIS IS IMPORTANT. Of course, you should rest when you tire. 3.    Avoid bending over or lifting heavy things for one week. Besides aggravating the swelling, this may raise your blood pressure and start a hemorrhage. 4.    Avoid hitting or bumping your new nose. It is wise not to  small children. 5.    Avoid excessive sunning of the face during the first month after your operation. A sunscreen is always advisable, but a total sunblock is suggested for the first month. 6.    Do not tweeze your eyebrows for one week. 7.    Avoid \"sniffing\", that is, constantly attempting to pull air through the nose as some people do when their nose feels blocked.   This will not relieve the sensation of blockage - it will only aggravate it because the suction created on the inside will cause more swelling. 8.    Avoid rubbing the nostrils and the base of the nose with Kleenex or a handkerchief. Not only will this aggravate the swelling, but also it may cause infection, bleeding, or the accumulation of scar tissue inside the nose. Use the \"moustache\" gauze dressing instead if discharge is excessive. DISCOLORATION  It is not unusual to have varying amounts of discoloration about the face. Like swelling, the discoloration may become more pronounced after you have been discharged. It usually lasts not more than a week or two, all the while decreasing in intensity. If the nasal bones were not reshaped, there is usually very little bruising. The measures which will help your swelling subside will also be working to decrease the amount of discoloration. You can camouflage the discoloration to some extent by using a thick makeup base. NUMBNESS  After surgery you will notice that the tip of your nose feels firm, and it is not uncommon for the nose to feel numb for a short time. If you have incisions inside your nose, you may be able to feel minor irregularities in its surface until all swelling disappears. NASAL PACKING AND BLEEDING  It is not uncommon to soak several gauze pads (your moustache dressing) during the first several hours after surgery. The frequency with which these are changed should decrease. If it does not, go to bed, apply ice compresses about the face, and report it to the office by telephone. You may be told to return to the office or hospital.    Change the drip pad as needed using 4x4 gauze and tape. Whenever the nasal passages are blocked, such as when you have a cold or an allergy, the nasal glands produce more mucous than normal.  Your nose is blocked from the postoperative swelling, so you can expect an increase in mucous drainage for several days.   It will be blood tinged and should cause you no concern unless the drainage looks like pure blood and flows freely, as when you cut a finger. If this happens, please call the office immediately. DO NOT attempt to remove blood clots or anything else from the nostrils. If a turbinate resection was part of your nasal procedure, bleeding can occur from this area for up to six weeks after your surgery. Be diligent in using the nasal drops and ointment. This helps the healing process and the dissolving of the crusts that form on the turbinates. Your exercise regimen will be curtailed at least to some extent for the first few weeks following surgery. Upper body exercise is especially prohibited, as it is more likely to cause turbinate bleeding. PAIN  There is usually little actual pain following nasal surgery, but you may experience a deep bruised sensation as a result of the postoperative swelling that occurs. As is usually the case with such things, this seems worse at night and when one becomes nervous. The usually prescribed drugs which minimize pain often cause sensations of light-headedness, particularly in the immediate postoperative period. This may seem to make your recovery more tedious. Please take the pain medicine as needed. Do not try to \"tough it out\" if you are uncomfortable. DO NOT take aspirin, ibuprofen, or any other NSAIDs (Non-Steroidal Anti-Inflammatory Drugs). NAUSEA  Sometimes the anesthesia, the pain pills, or swallowed blood will make you nauseated. You will receive a prescription for anti-nausea medication to use if needed. DEPRESSION  It is not unusual for an individual to go through a period of mild depression twelve to thirty-six hours after surgery. Even though you very much want this surgery, and even though we have tried to tell you what to expect postoperatively, you may be somewhat shocked at seeing your own face swollen and bruised. This is a very temporary condition which will subside shortly. The best \"treatment\" is to busy yourself with the details of your postoperative care and try to remember that the recovery period will soon be over. INSOMNIA  You may experience some difficulty falling asleep. For this we have prescribed a sleeping pill. If you must take one, remember that such drugs make some people feel light-headed and weak. NOSE DROPS  Salt water nose drops and an antibiotic ointment are prescribed to keep the nose moist and open. This will prevent any crusts from forming. KEEPING A STIFF UPPER LIP  The upper lip is important in nasal surgery, as much work is done in this area. To keep the healing tissues from being disturbed, do not move your upper lip for as long as the bandage is in place. Avoid excessive smiling. Avoid pursing the lips such as in kissing for ten days. Do not pull the upper lip down as women do when applying lipstick. Apply lipstick with a brush. The upper teeth should be cleaned with toothpaste on a face cloth. The lower teeth may be brushed as usual.    Avoid gum or foods that are hard to chew. Soups, mashed potatoes, stewed chicken, hamburger steak, or any easily chewed food is permissible. After the first few days, if you have chosen to continue wearing a moustache dressing because of the excessive mucous drainage, do not change it more than twice daily. Changing the dressing has a tendency to move the upper lip and the base of the nose. The fact that these dressings have become soiled does not hurt anything except, perhaps, one's sensibilities. You should be able to put up with this for a few days in a good cause. Incidentally, if the dressing becomes stuck, it may be loosened with a few drops of peroxide, however do not use peroxide in the inside of the nose. Also, the best type of adhesive tape to use is Micropore paper tape, because it is less irritating to the skin. It is readily available in most drug stores.     CLEANING THE NOSE  Don't blow the nose at all for ten days. After that, blow through both sides at once. Do not compress one side. You may clean the outside of the nose and the upper lip with cotton tipped applicators (Q-tips) moistened with warm water. You can do this as soon as you return from the hospital, but do not rub the nose very hard. The outside and near inside of the nostrils may need to be cleaned with a Q-tip moistened with warm water or hydrogen peroxide if crusting is present. One of the antibiotic ointments that you will be given, usually Bacitracin, should be applied to the inside of the nose with a Q-tip. Twist the Q-tip around inside gently; you can go in about an inch or until you feel any resistance. This will help prevent crusting and help you to breathe better. This should be done at least 3 to 4 times a day. You were also given a saline spray. Lie on the bed, hang your head over the side, and drop in 6-10 drops in each side. Do this before you put the ointment in 3 to 4 times a day. Soon after the bandage has been removed, the skin on the nose should be cleaned gently in your usual manner twice a day, to remove the oily material that is produced by the skin glands. This will also assist in the reduction of swelling. DRYNESS OF THE LIPS  If your lips become dry from breathing through your mouth, coat them with Vaseline or lipstick. A vaporizer with plain water by the bedside at night might be a helpful addition. TEMPERATURE  Generally, the body temperature does not rise much above 100 degrees following nasal surgery. This rise usually occurs if the patient becomes slightly dehydrated because he does not drink enough water. Take your temperature either rectally or in the armpit. The rectal temperature is one degree above the oral temperature, and the armpit temperature is one degree below the oral temperature.   Report any persistent temperature above 100 degrees. WEAKNESS  It is not unusual for a person who has had an anesthetic or any type of operation to feel weak, have palpitations, break out in \"cold sweats\", or get dizzy. This gradually clears up in a few days without medication. MEDICATION  Our office will usually give you all of your prescriptions the day before or the day of your surgery. Almost all patients will be given an antibiotic to be taken after surgery. Obtain explicit instructions about this medicine from the nurse. Multivitamins with vitamin C are suggested for the pre- and postoperative periods, and can be obtained by you without a prescription. We strongly discourage you from taking any Vitamin E preparations prior to or after surgery. These may increase the probability of bleeding. If you develop a rash or other reaction while you are taking one of the medicines, this could mean that you are developing an allergy to the medicine. If this occurs, please stop taking your medications and call the office immediately. YOUR FIRST POSTOPERATIVE OFFICE VISIT  The appointment for your first postop visit should be made prior to surgery. This appointment will most probably be for the day after surgery, particularly if your surgery was performed on an outpatient basis. It is generally on the first postoperative day that the minimal nasal packing that was placed is removed. It is normal to have a small amount of bloody oozing after this. It is important that you try to eat or drink something before coming into the office the day after surgery. Ideally, this should be high in calories and protein, such as milk and cookies. If you don't feel up to this, at least a soft drink with high sugar content is advisable. POSTOPERATIVE CARE  Following your surgery, we will want to see you in the office at regularly scheduled intervals to monitor your progress.     RESUMING ACTIVITIES  The head of your bed will no longer need to be elevated after the splint has been removed. While the bandage is in place, don't wear any pull over clothing. NO STRENUOUS ATHLETIC ACTIVITY FOR ONE MONTH, including swimming, jogging, aerobics, etc.  No diving or water skiing for two months. No contact sports for four months. Avoid sneezing until the bandage is removed. If you must sneeze, let it come out like a cough - through the mouth. If it becomes a real problem, we will prescribe medication to alleviate the condition. Eyeglasses may be worn as long as the metal splint remains on the nose. After the splint is removed, glasses  must be suspended from the forehead for a period of about six weeks. If this is not done, the pressure of your glasses may change the contour of your nose. Your glasses can be suspended from the nose after your splint is removed in three ways. One way is to use a piece of tape to hold the glasses on your forehead so that the weight is off your nose. Another alternative is a \"noseguard\". You can also have prescription lenses made for special frames that fit comfortably around your head. This is the most costly option, and involves seeing an optometrist. We can provide you with further information about any of these alternatives, and our nurses will be happy to discuss these with you to help you choose the option that best fits your needs. Contact lenses may be worn the day after surgery. RETURNING TO WORK OR SCHOOL  The average patient is able to return to school the day after the bandage is removed. That will be about five to six days after your surgery. Some hardy souls have returned earlier. Returning to work depends on several factors: the amount of physical activity involved in your position, the amount of public contact your job requires, and the amount of swelling and discoloration that you may develop. On the average, you may return to work on the 8th to 10th postoperative day.     INJURY TO THE NOSE  Some individuals sustain accidents during the early postoperative period. You need not be too concerned unless the blow is hard enough to cause significant bleeding, swelling or pain. If a blow is sustained while the metal splint is still on, this should help protect the nose. However, for the first five weeks after the nasal splint is removed, more attention should be paid to any injury to the nose. Blows to the nose can cause the nasal bones to become deviated. Please report any accident to the office immediately if you feel it was a significant bump. Otherwise, let us know about it at your next visit. FINALLY  Remember the things you were told before your operation:    When the bandage is first removed, your nose may appear fat and turned up too much. This is caused by the operative swelling over the nose and in the upper lip. The swelling will subside to a great extent during the next week. However, remember that it will take up to one or two years for all the swelling to disappear and for your nose to reach its final contour. The discoloration will gradually disappear over a period of seven to ten days, in most cases. The thicker and oilier the skin, the longer it takes for the swelling to subside. The upper lip may seem stiff for some time after surgery, and you may feel that this interferes with your smile. Be patient. This will disappear within a few weeks. The tip of the nose sometimes feels numb after nasal surgery. This will eventually disappear. Occasionally, the upper teeth will have tingling if extensive septal work was necessary. This, too, will resolve with time. Follow-up with Zo Price MD tomorrow as scheduled. If you have any questions, please call us at [phone]. We are always happy to answer your questions, and if you should have a problem, this number is answered 24 hours a day.     DISCHARGE SUMMARY from your Nurse    The following personal items collected during your admission are returned to you:   Dental Appliance: Dental Appliances: None  Vision: Visual Aid: None  Hearing Aid:    Jewelry: Jewelry: Other (comment), Earrings (to spouse)  Clothing: Clothing:  (clothing to locker)  Other Valuables: Other Valuables: None  Valuables sent to safe:      PATIENT INSTRUCTIONS:    After general anesthesia or intravenous sedation, for 24 hours or while taking prescription Narcotics:  · Limit your activities  · Do not drive and operate hazardous machinery  · Do not make important personal or business decisions  · Do  not drink alcoholic beverages  · If you have not urinated within 8 hours after discharge, please contact your surgeon on call. Report the following to your surgeon:  · Excessive pain, swelling, redness or odor of or around the surgical area  · Temperature over 100.5  · Nausea and vomiting lasting longer than 4 hours or if unable to take medications  · Any signs of decreased circulation or nerve impairment to extremity: change in color, persistent  numbness, tingling, coldness or increase pain  · Any questions    COUGH AND DEEP BREATHE    Breathing deep and coughing are very important exercises to do after surgery. Deep breathing and coughing open the little air tubes and air sacks in your lungs. You take deep breaths every day. You may not even notice - it is just something you do when you sigh or yawn. It is a natural exercise you do to keep these air passages open. After surgery, take deep breaths and cough, on purpose. Coughing and deep breathing help prevent bronchitis and pneumonia after surgery. If you had chest or belly surgery, use a pillow as a \"hug buddy\" and hold it tightly to your chest or belly when you cough. DIRECTIONS:  6. Take 10 to 15 slow deep breaths every hour while awake. 7. Breathe in deeply, and hold it for 2 seconds. 8. Exhale slowly through puckered lips, like blowing up a balloon.   9. After every 4th or 5th deep breath, hug your pillow to your chest or belly and give a hard, deep cough. Yes, it will probably hurt. But doing this exercise is very important part of healing after surgery. Take your pain medicine to help you do this exercise without too much pain. IF YOU HAVE BEEN DIAGNOSED WITH SLEEP APNEA, PLEASE USE YOUR SLEEP APNEA DEVICE OR CPAP MACHINE WHEN YOU INTEND TO NAP AFTER TAKING PAIN MEDICATION. Ankle Pumps    Ankle pumps increase the circulation of oxygenated blood to your lower extremities and decrease your risk for circulation problems such as blood clots. They also stretch the muscles, tendons and ligaments in your foot and ankle, and prevent joint contracture in the ankle and foot, especially after surgeries on the legs. It is important to do ankle pump exercises regularly after surgery because immobility increases your risk for developing a blood clot. Your doctor may also have you take an Aspirin for the next few days as well. If your doctor did not ask you to take an Aspirin, consult with him before starting Aspirin therapy on your own. Slowly point your foot forward, feeling the muscles on the top of your lower leg stretch, and hold this position for 5 seconds. Next, pull your foot back toward you as far as possible, stretching the calf muscles, and hold that position for 5 seconds. Repeat with the other foot. Perform 10 repetitions every hour while awake for both ankles if possible (down and then up with the foot once is one repetition). You should feel gentle stretching of the muscles in your lower leg when doing this exercise. If you feel pain, or your range of motion is limited, don't  Push too hard. Only go the limit your joint and muscles will let you go. If you have increasing pain, progressively worsening leg warmth or swelling, STOP the exercise and call your doctor.      These are general instructions for a healthy lifestyle:    *  Please give a list of your current medications to your Primary Care Provider. *  Please update this list whenever your medications are discontinued, doses are      changed, or new medications (including over-the-counter products) are added. *  Please carry medication information at all times in case of emergency situations. About Smoking  No smoking / No tobacco products / Avoid exposure to second hand smoke    Surgeon General's Warning:  Quitting smoking now greatly reduces serious risk to your health. Obesity, smoking, and sedentary lifestyle greatly increases your risk for illness and disease. A healthy diet, regular physical exercise & weight monitoring are important for maintaining a healthy lifestyle. Congestive Heart Failure  You may be retaining fluid if you have a history of heart failure or if you experience any of the following symptoms:  Weight gain of 3 pounds or more overnight or 5 pounds in a week, increased swelling in our hands or feet or shortness of breath while lying flat in bed. Please call your doctor as soon as you notice any of these symptoms; do not wait until your next office visit. Recognize signs and symptoms of STROKE:  F - face looks uneven  A - arms unable to move or move even  S - speech slurred or non-existent  T - time-call 911 as soon as signs and symptoms begin-DO NOT go         Back to bed or wait to see if you get better-TIME IS BRAIN. Warning signs of HEART ATTACK  Call 911 if you have these symptoms    · Chest discomfort. Most heart attacks involve discomfort in the center of the chest that lasts more than a few minutes, or that goes away and comes back. It can feel like uncomfortable pressure, squeezing, fullness, or pain. · Discomfort in other areas of the upper body. Symptoms can include pain or discomfort in one or both        Arms, the back, neck, jaw, or stomach.   ·  Shortness of breath with or without chest discomfort. · Other signs may include breaking out in a cold sweat, nausea, or lightheadedness    Don't wait more than five minutes to call 911 - MINUTES MATTER! Fast action can save your life. Calling 911 is almost always the fastest way to get lifesaving treatment. Emergency Medical Services staff can begin treatment when they arrive - up to an hour sooner than if someone gets to the hospital by car.

## 2018-02-02 NOTE — PERIOP NOTES
[facility]  PREOPERATIVE INSTRUCTIONS    Surgery Date:   2/14/18      Surgery arrival time given by surgeon: NO  (If [facility] staff will call you between 3pm - 7pm the day before surgery with your arrival time. If your surgery is on a Monday, we will call you the preceding Friday. Please call [phone] after 7pm if you did not receive your arrival time.)  1. Report  to the 2nd Floor Admitting Desk on the day of your surgery. Bring your insurance card, photo identification, and any copayment (if applicable). 2. You must have a responsible adult to drive you home and stay with you the first 24 hours after surgery if you are going home the same day of your surgery. 3. Nothing to eat or drink after midnight the night before surgery. This means NO water, gum, mints, coffee, juice, etc.    4. MEDICATIONS TO TAKE THE MORNING OF SURGERY WITH A SIP OF WATER:none  5. No alcoholic beverages 24 hours before and after your surgery. 6. If you are being admitted to the hospital,please leave personal belongings/luggage in your car until you have an assigned hospital room number. ( The hospital discharge time is 12 PM NOON. Your adult  should be at the hospital prior to the noon discharge time unless otherwise instructed.)   7. STOP Aspirin and/or any non-steroidal anti-inflammatory drugs (i.e. Ibuprofen, Naproxen, Advil, Aleve) as directed by your surgeon. You may take Tylenol. Stop herbal supplements 1 week prior to  surgery. 8. If you are currently taking Plavix, Coumadin,or any other blood-thinning/ anticoagulant medication contact your surgeon for instructions. 9. Wear comfortable clothes. Wear your glasses instead of contacts. Please leave all money, jewelry and valuables at home. No make up, particularly mascara, the day of surgery. 10.  REMOVE ALL body piercings, rings,and jewelry and leave at home. Wear your hair loose or down, no pony-tails, buns, or any metal hair clips.    11. If you shower the morning of surgery, please do not apply any lotions, powders, or deodorants afterwards. Do not shave any body area within 24 hours of your surgery. 12. Please follow all instructions to avoid any potential surgical cancellation. 13. Should your physical condition change, (i.e. fever, cold, flu, etc.) please notify your surgeon as soon as possible. 14. It is important to be on time. If a situation occurs where you may be delayed, please call:  [phone]  on the day of surgery. 15. The [facility] staff can be reached at 21 [phone]. 16. Special instructions: free  parking  17. The patient was contacted  via phone. She  verbalize  understanding of all instructions does not  need reinforcement.

## 2018-02-13 ENCOUNTER — ANESTHESIA EVENT (OUTPATIENT)
Dept: SURGERY | Age: 28
End: 2018-02-13
Payer: SELF-PAY

## 2018-02-14 ENCOUNTER — HOSPITAL ENCOUNTER (OUTPATIENT)
Age: 28
Setting detail: OUTPATIENT SURGERY
Discharge: HOME | End: 2018-02-14
Attending: OTOLARYNGOLOGY | Admitting: OTOLARYNGOLOGY
Payer: SELF-PAY

## 2018-02-14 ENCOUNTER — ANESTHESIA (OUTPATIENT)
Dept: SURGERY | Age: 28
End: 2018-02-14
Payer: SELF-PAY

## 2018-02-14 VITALS
RESPIRATION RATE: 16 BRPM | DIASTOLIC BLOOD PRESSURE: 82 MMHG | HEART RATE: 93 BPM | SYSTOLIC BLOOD PRESSURE: 126 MMHG | BODY MASS INDEX: 19.98 KG/M2 | WEIGHT: 124.34 LBS | TEMPERATURE: 97.8 F | HEIGHT: 66 IN | OXYGEN SATURATION: 97 %

## 2018-02-14 DIAGNOSIS — L90.5 SCAR OF NOSE: Primary | ICD-10-CM

## 2018-02-14 LAB — HCG UR QL: NEGATIVE

## 2018-02-14 PROCEDURE — 76210000006 HC OR PH I REC 0.5 TO 1 HR: Performed by: OTOLARYNGOLOGY

## 2018-02-14 PROCEDURE — 77030032490 HC SLV COMPR SCD KNE COVD -B: Performed by: OTOLARYNGOLOGY

## 2018-02-14 PROCEDURE — 77030008771 HC TU NG SALEM SUMP -A: Performed by: ANESTHESIOLOGY

## 2018-02-14 PROCEDURE — 74011000250 HC RX REV CODE- 250

## 2018-02-14 PROCEDURE — 74011250636 HC RX REV CODE- 250/636: Performed by: ANESTHESIOLOGY

## 2018-02-14 PROCEDURE — 77030011267 HC ELECTRD BLD COVD -A: Performed by: OTOLARYNGOLOGY

## 2018-02-14 PROCEDURE — 74011250636 HC RX REV CODE- 250/636

## 2018-02-14 PROCEDURE — 76060000033 HC ANESTHESIA 1 TO 1.5 HR: Performed by: OTOLARYNGOLOGY

## 2018-02-14 PROCEDURE — 77030019615 HC ELCTRD EMG NDL MEDT -B: Performed by: OTOLARYNGOLOGY

## 2018-02-14 PROCEDURE — 74011250636 HC RX REV CODE- 250/636: Performed by: OTOLARYNGOLOGY

## 2018-02-14 PROCEDURE — 74011000250 HC RX REV CODE- 250: Performed by: OTOLARYNGOLOGY

## 2018-02-14 PROCEDURE — 77030020782 HC GWN BAIR PAWS FLX 3M -B

## 2018-02-14 PROCEDURE — 77030008684 HC TU ET CUF COVD -B: Performed by: ANESTHESIOLOGY

## 2018-02-14 PROCEDURE — 77030011640 HC PAD GRND REM COVD -A: Performed by: OTOLARYNGOLOGY

## 2018-02-14 PROCEDURE — 77030018836 HC SOL IRR NACL ICUM -A: Performed by: OTOLARYNGOLOGY

## 2018-02-14 PROCEDURE — 77030026438 HC STYL ET INTUB CARD -A: Performed by: ANESTHESIOLOGY

## 2018-02-14 PROCEDURE — 74011250637 HC RX REV CODE- 250/637: Performed by: OTOLARYNGOLOGY

## 2018-02-14 PROCEDURE — 76210000020 HC REC RM PH II FIRST 0.5 HR: Performed by: OTOLARYNGOLOGY

## 2018-02-14 PROCEDURE — 76010000149 HC OR TIME 1 TO 1.5 HR: Performed by: OTOLARYNGOLOGY

## 2018-02-14 PROCEDURE — 77030002974 HC SUT PLN J&J -A: Performed by: OTOLARYNGOLOGY

## 2018-02-14 PROCEDURE — 81025 URINE PREGNANCY TEST: CPT

## 2018-02-14 RX ORDER — BACITRACIN ZINC 500 UNIT/G
OINTMENT (GRAM) TOPICAL
Qty: 15 G | Refills: 0 | Status: SHIPPED | OUTPATIENT
Start: 2018-02-14

## 2018-02-14 RX ORDER — FENTANYL CITRATE 50 UG/ML
INJECTION, SOLUTION INTRAMUSCULAR; INTRAVENOUS AS NEEDED
Status: DISCONTINUED | OUTPATIENT
Start: 2018-02-14 | End: 2018-02-14 | Stop reason: HOSPADM

## 2018-02-14 RX ORDER — ONDANSETRON 4 MG/1
4 TABLET, ORALLY DISINTEGRATING ORAL
Qty: 20 TAB | Refills: 2 | Status: SHIPPED | OUTPATIENT
Start: 2018-02-14

## 2018-02-14 RX ORDER — HYDROMORPHONE HYDROCHLORIDE 1 MG/ML
.25-1 INJECTION, SOLUTION INTRAMUSCULAR; INTRAVENOUS; SUBCUTANEOUS
Status: DISCONTINUED | OUTPATIENT
Start: 2018-02-14 | End: 2018-02-14 | Stop reason: HOSPADM

## 2018-02-14 RX ORDER — BACITRACIN ZINC 500 UNIT/G
OINTMENT (GRAM) TOPICAL AS NEEDED
Status: DISCONTINUED | OUTPATIENT
Start: 2018-02-14 | End: 2018-02-14 | Stop reason: HOSPADM

## 2018-02-14 RX ORDER — MIDAZOLAM HYDROCHLORIDE 1 MG/ML
INJECTION, SOLUTION INTRAMUSCULAR; INTRAVENOUS AS NEEDED
Status: DISCONTINUED | OUTPATIENT
Start: 2018-02-14 | End: 2018-02-14 | Stop reason: HOSPADM

## 2018-02-14 RX ORDER — FLUMAZENIL 0.1 MG/ML
0.2 INJECTION INTRAVENOUS
Status: DISCONTINUED | OUTPATIENT
Start: 2018-02-14 | End: 2018-02-14 | Stop reason: HOSPADM

## 2018-02-14 RX ORDER — LIDOCAINE HYDROCHLORIDE 10 MG/ML
0.1 INJECTION, SOLUTION EPIDURAL; INFILTRATION; INTRACAUDAL; PERINEURAL AS NEEDED
Status: DISCONTINUED | OUTPATIENT
Start: 2018-02-14 | End: 2018-02-14 | Stop reason: HOSPADM

## 2018-02-14 RX ORDER — SODIUM CHLORIDE 0.9 % (FLUSH) 0.9 %
5-10 SYRINGE (ML) INJECTION EVERY 8 HOURS
Status: DISCONTINUED | OUTPATIENT
Start: 2018-02-14 | End: 2018-02-14 | Stop reason: HOSPADM

## 2018-02-14 RX ORDER — HYDROCODONE BITARTRATE AND ACETAMINOPHEN 5; 325 MG/1; MG/1
1 TABLET ORAL
Status: COMPLETED | OUTPATIENT
Start: 2018-02-14 | End: 2018-02-14

## 2018-02-14 RX ORDER — ONDANSETRON 2 MG/ML
INJECTION INTRAMUSCULAR; INTRAVENOUS AS NEEDED
Status: DISCONTINUED | OUTPATIENT
Start: 2018-02-14 | End: 2018-02-14 | Stop reason: HOSPADM

## 2018-02-14 RX ORDER — DEXAMETHASONE SODIUM PHOSPHATE 4 MG/ML
INJECTION, SOLUTION INTRA-ARTICULAR; INTRALESIONAL; INTRAMUSCULAR; INTRAVENOUS; SOFT TISSUE AS NEEDED
Status: DISCONTINUED | OUTPATIENT
Start: 2018-02-14 | End: 2018-02-14 | Stop reason: HOSPADM

## 2018-02-14 RX ORDER — ROCURONIUM BROMIDE 10 MG/ML
INJECTION, SOLUTION INTRAVENOUS AS NEEDED
Status: DISCONTINUED | OUTPATIENT
Start: 2018-02-14 | End: 2018-02-14 | Stop reason: HOSPADM

## 2018-02-14 RX ORDER — PROPOFOL 10 MG/ML
INJECTION, EMULSION INTRAVENOUS AS NEEDED
Status: DISCONTINUED | OUTPATIENT
Start: 2018-02-14 | End: 2018-02-14 | Stop reason: HOSPADM

## 2018-02-14 RX ORDER — SODIUM CHLORIDE, SODIUM LACTATE, POTASSIUM CHLORIDE, CALCIUM CHLORIDE 600; 310; 30; 20 MG/100ML; MG/100ML; MG/100ML; MG/100ML
125 INJECTION, SOLUTION INTRAVENOUS CONTINUOUS
Status: DISCONTINUED | OUTPATIENT
Start: 2018-02-14 | End: 2018-02-14 | Stop reason: HOSPADM

## 2018-02-14 RX ORDER — SODIUM CHLORIDE 0.9 % (FLUSH) 0.9 %
5-10 SYRINGE (ML) INJECTION AS NEEDED
Status: DISCONTINUED | OUTPATIENT
Start: 2018-02-14 | End: 2018-02-14 | Stop reason: HOSPADM

## 2018-02-14 RX ORDER — HYDROCODONE BITARTRATE AND ACETAMINOPHEN 5; 325 MG/1; MG/1
1-2 TABLET ORAL
Qty: 20 TAB | Refills: 0 | Status: SHIPPED | OUTPATIENT
Start: 2018-02-14

## 2018-02-14 RX ORDER — OXYMETAZOLINE HCL 0.05 %
SPRAY, NON-AEROSOL (ML) NASAL AS NEEDED
Status: DISCONTINUED | OUTPATIENT
Start: 2018-02-14 | End: 2018-02-14 | Stop reason: HOSPADM

## 2018-02-14 RX ORDER — NALOXONE HYDROCHLORIDE 0.4 MG/ML
0.2 INJECTION, SOLUTION INTRAMUSCULAR; INTRAVENOUS; SUBCUTANEOUS
Status: DISCONTINUED | OUTPATIENT
Start: 2018-02-14 | End: 2018-02-14 | Stop reason: HOSPADM

## 2018-02-14 RX ORDER — CEFAZOLIN SODIUM 1 G/3ML
INJECTION, POWDER, FOR SOLUTION INTRAMUSCULAR; INTRAVENOUS AS NEEDED
Status: DISCONTINUED | OUTPATIENT
Start: 2018-02-14 | End: 2018-02-14 | Stop reason: HOSPADM

## 2018-02-14 RX ORDER — CEFDINIR 300 MG/1
600 CAPSULE ORAL DAILY
Qty: 10 CAP | Refills: 0 | Status: SHIPPED | OUTPATIENT
Start: 2018-02-14 | End: 2018-02-19

## 2018-02-14 RX ORDER — DIPHENHYDRAMINE HYDROCHLORIDE 50 MG/ML
12.5 INJECTION, SOLUTION INTRAMUSCULAR; INTRAVENOUS AS NEEDED
Status: DISCONTINUED | OUTPATIENT
Start: 2018-02-14 | End: 2018-02-14 | Stop reason: HOSPADM

## 2018-02-14 RX ORDER — LIDOCAINE HYDROCHLORIDE AND EPINEPHRINE 10; 10 MG/ML; UG/ML
INJECTION, SOLUTION INFILTRATION; PERINEURAL AS NEEDED
Status: DISCONTINUED | OUTPATIENT
Start: 2018-02-14 | End: 2018-02-14 | Stop reason: HOSPADM

## 2018-02-14 RX ADMIN — Medication 10 ML: at 06:38

## 2018-02-14 RX ADMIN — FENTANYL CITRATE 50 MCG: 50 INJECTION, SOLUTION INTRAMUSCULAR; INTRAVENOUS at 07:33

## 2018-02-14 RX ADMIN — ONDANSETRON 4 MG: 2 INJECTION INTRAMUSCULAR; INTRAVENOUS at 08:30

## 2018-02-14 RX ADMIN — PROPOFOL 200 MG: 10 INJECTION, EMULSION INTRAVENOUS at 07:37

## 2018-02-14 RX ADMIN — HYDROMORPHONE HYDROCHLORIDE 0.5 MG: 1 INJECTION, SOLUTION INTRAMUSCULAR; INTRAVENOUS; SUBCUTANEOUS at 09:22

## 2018-02-14 RX ADMIN — CEFAZOLIN SODIUM 2 G: 1 INJECTION, POWDER, FOR SOLUTION INTRAMUSCULAR; INTRAVENOUS at 07:50

## 2018-02-14 RX ADMIN — FENTANYL CITRATE 50 MCG: 50 INJECTION, SOLUTION INTRAMUSCULAR; INTRAVENOUS at 08:36

## 2018-02-14 RX ADMIN — FENTANYL CITRATE 50 MCG: 50 INJECTION, SOLUTION INTRAMUSCULAR; INTRAVENOUS at 07:36

## 2018-02-14 RX ADMIN — FENTANYL CITRATE 50 MCG: 50 INJECTION, SOLUTION INTRAMUSCULAR; INTRAVENOUS at 07:30

## 2018-02-14 RX ADMIN — ROCURONIUM BROMIDE 30 MG: 10 INJECTION, SOLUTION INTRAVENOUS at 07:37

## 2018-02-14 RX ADMIN — MIDAZOLAM HYDROCHLORIDE 2 MG: 1 INJECTION, SOLUTION INTRAMUSCULAR; INTRAVENOUS at 07:30

## 2018-02-14 RX ADMIN — DEXAMETHASONE SODIUM PHOSPHATE 4 MG: 4 INJECTION, SOLUTION INTRA-ARTICULAR; INTRALESIONAL; INTRAMUSCULAR; INTRAVENOUS; SOFT TISSUE at 07:49

## 2018-02-14 RX ADMIN — HYDROMORPHONE HYDROCHLORIDE 0.5 MG: 1 INJECTION, SOLUTION INTRAMUSCULAR; INTRAVENOUS; SUBCUTANEOUS at 08:56

## 2018-02-14 RX ADMIN — SODIUM CHLORIDE, POTASSIUM CHLORIDE, SODIUM LACTATE AND CALCIUM CHLORIDE 125 ML/HR: 600; 310; 30; 20 INJECTION, SOLUTION INTRAVENOUS at 06:37

## 2018-02-14 RX ADMIN — HYDROMORPHONE HYDROCHLORIDE 0.5 MG: 1 INJECTION, SOLUTION INTRAMUSCULAR; INTRAVENOUS; SUBCUTANEOUS at 09:07

## 2018-02-14 RX ADMIN — HYDROCODONE BITARTRATE AND ACETAMINOPHEN 1 TABLET: 5; 325 TABLET ORAL at 10:13

## 2018-02-14 NOTE — IP AVS SNAPSHOT
Summary of Care Report The Summary of Care report has been created to help improve care coordination. Users with access to [employer] or [address] (Web-based application) may access additional patient information including the Discharge Summary. If you are not currently a [address] user and need more information, please call the number listed below in the [place] [address] section and ask to be connected with Medical Records. Facility Information Name Address Phone [address] [id number] [phone] Patient Information Patient Name Sex MITA Willis ([id number]) Female 1990 Discharge Information Admitting Provider Service Area Unit Jadon Bolton MD / [phone] [phone] / [phone] Discharge Provider Discharge Date/Time Discharge Disposition Destination (none) 2018 (Pending) AHR (none) Patient Language Language ENGLISH [13] You are allergic to the following No active allergies Current Discharge Medication List  
  
START taking these medications Dose & Instructions Dispensing Information Comments  
 bacitracin zinc ointment Commonly known as:  BACITRACIN Apply inside nostrils as directed 4x/day Quantity:  15 g Refills:  0  
   
 cefdinir 300 mg capsule Commonly known as:  OMNICEF Dose:  600 mg Take 2 Caps by mouth daily for 5 days. Quantity:  10 Cap Refills:  0 HYDROcodone-acetaminophen 5-325 mg per tablet Commonly known as:  Flaquita Chain Dose:  1-2 Tab Take 1-2 Tabs by mouth every four (4) hours as needed for Pain. Max Daily Amount: 12 Tabs. Quantity:  20 Tab Refills:  0  
   
 ondansetron 4 mg disintegrating tablet Commonly known as:  ZOFRAN ODT Dose:  4 mg Take 1 Tab by mouth every four (4) hours as needed for Nausea. Quantity:  20 Tab Refills:  2  
   
 sodium chloride 0.9 % Spra Commonly known as:  STERILE SALINE Dose:  2 Spray 2 Sprays by Nasal route every two (2) hours (while awake). To start the night of surgery Quantity:  1 Bottle Refills:  prn Surgery Information ID Date/Time Status Primary Surgeon All Procedures Location [id number] 2018 0730 Unposted Val Garcia MD NASAL SCAR REVISION  [facility] MAIN OR Follow-up Information Follow up With Details Comments Contact Info None   None (395) Patient stated that they have no PCP Discharge Instructions Patient Discharge Instructions Millicent Yo / [id number] : 1990 Admitted 2018 Discharged: 2018 Patient Discharge Instructions - Rhinoplasty The following instructions have been designed to answer practically every question that might arise regarding the \"do's\" and \"don'ts\" after surgery. You and your family should read these several times to become familiar with them. Follow them faithfully, because those who do generally have the smoothest postoperative course. SWELLING Every operation, no matter how minor, is accompanied by swelling of the surrounding tissues. The amount varies from person to person. The swelling itself is not serious and is to be expected after your surgery. It sometimes is worse on the second postoperative day than it was on the first, and in the mornings. Remember is that swelling will always subside eventually. You can help decrease the swelling in the following ways: 1. Sleep with your head elevated until all of the dressings have been removed from the nose. Use an additional pillow or two under the mattress, if necessary.  
2.    Stay up (sitting, standing, walking about) as much as possible after your return home. THIS IS IMPORTANT. Of course, you should rest when you tire. 3.    Avoid bending over or lifting heavy things for one week. Besides aggravating the swelling, this may raise your blood pressure and start a hemorrhage. 4.    Avoid hitting or bumping your new nose. It is wise not to  small children. 5.    Avoid excessive sunning of the face during the first month after your operation. A sunscreen is always advisable, but a total sunblock is suggested for the first month. 6.    Do not tweeze your eyebrows for one week. 7.    Avoid \"sniffing\", that is, constantly attempting to pull air through the nose as some people do when their nose feels blocked. This will not relieve the sensation of blockage - it will only aggravate it because the suction created on the inside will cause more swelling. 8.    Avoid rubbing the nostrils and the base of the nose with Kleenex or a handkerchief. Not only will this aggravate the swelling, but also it may cause infection, bleeding, or the accumulation of scar tissue inside the nose. Use the \"moustache\" gauze dressing instead if discharge is excessive. DISCOLORATION It is not unusual to have varying amounts of discoloration about the face. Like swelling, the discoloration may become more pronounced after you have been discharged. It usually lasts not more than a week or two, all the while decreasing in intensity. If the nasal bones were not reshaped, there is usually very little bruising. The measures which will help your swelling subside will also be working to decrease the amount of discoloration. You can camouflage the discoloration to some extent by using a thick makeup base. NUMBNESS After surgery you will notice that the tip of your nose feels firm, and it is not uncommon for the nose to feel numb for a short time.   If you have incisions inside your nose, you may be able to feel minor irregularities in its surface until all swelling disappears. NASAL PACKING AND BLEEDING It is not uncommon to soak several gauze pads (your moustache dressing) during the first several hours after surgery. The frequency with which these are changed should decrease. If it does not, go to bed, apply ice compresses about the face, and report it to the office by telephone. You may be told to return to the office or hospital. 
 
Change the drip pad as needed using 4x4 gauze and tape. If the gauze becomes stuck to your nose, you can soften it with the nasal saline drops. Whenever the nasal passages are blocked, such as when you have a cold or an allergy, the nasal glands produce more mucous than normal.  Your nose is blocked from the postoperative swelling, so you can expect an increase in mucous drainage for several days. It will be blood tinged and should cause you no concern unless the drainage looks like pure blood and flows freely, as when you cut a finger. If this happens, please call the office immediately. DO NOT attempt to remove blood clots or anything else from the nostrils. If a turbinate resection was part of your nasal procedure, bleeding can occur from this area for up to six weeks after your surgery. Be diligent in using the nasal drops and ointment. This helps the healing process and the dissolving of the crusts that form on the turbinates. Your exercise regimen will be curtailed at least to some extent for the first few weeks following surgery. Upper body exercise is especially prohibited, as it is more likely to cause turbinate bleeding. PAIN There is usually little actual pain following nasal surgery, but you may experience a deep bruised sensation as a result of the postoperative swelling that occurs. As is usually the case with such things, this seems worse at night and when one becomes nervous.  
 
The usually prescribed drugs which minimize pain often cause sensations of light-headedness, particularly in the immediate postoperative period. This may seem to make your recovery more tedious. Please take the pain medicine as needed. Do not try to \"tough it out\" if you are uncomfortable. DO NOT take aspirin, ibuprofen, or any other NSAIDs (Non-Steroidal Anti-Inflammatory Drugs). NAUSEA Sometimes the anesthesia, the pain pills, or swallowed blood will make you nauseated. You will receive a prescription for anti-nausea medication to use if needed. DEPRESSION It is not unusual for an individual to go through a period of mild depression twelve to thirty-six hours after surgery. Even though you very much want this surgery, and even though we have tried to tell you what to expect postoperatively, you may be somewhat shocked at seeing your own face swollen and bruised. This is a very temporary condition which will subside shortly. The best \"treatment\" is to busy yourself with the details of your postoperative care and try to remember that the recovery period will soon be over. INSOMNIA You may experience some difficulty falling asleep. For this we have prescribed a sleeping pill. If you must take one, remember that such drugs make some people feel light-headed and weak. KEEPING A STIFF UPPER LIP The upper lip is important in nasal surgery, as much work is done in this area. To keep the healing tissues from being disturbed, do not move your upper lip for as long as the bandage is in place. Avoid pursing the lips such as in kissing for ten days. Do not pull the upper lip down as women do when applying lipstick. Apply lipstick with a brush. Be careful not to manipulate the upper lip excessively when brushing your teeth. Avoid gum or foods that are hard to chew. Soups, mashed potatoes, stewed chicken, hamburger steak, or any easily chewed food is permissible. CLEANING THE NOSE Don't blow the nose at all for ten days.   After that, blow through both sides at once. Do not compress one side. The outside and near inside of the nostrils may need to be cleaned with a Q-tip moistened with warm water or hydrogen peroxide if crusting is present. The antibiotic ointment that you will be prescribed, usually Bacitracin, should be applied to the inside of the nose with a Q-tip 3 to 4 times a day. Twist the Q-tip around inside gently; you can go in about an inch or until you feel any resistance. This will help prevent crusting and help you to breathe better. You were also prescribed a saline spray. Apply a few sprays on each side every few hours while awake. DRYNESS OF THE LIPS If your lips become dry from breathing through your mouth, coat them with Vaseline or lipstick. A vaporizer with plain water by the bedside at night might be a helpful addition. TEMPERATURE Generally, the body temperature does not rise much above 100 degrees following nasal surgery. This rise usually occurs if the patient becomes slightly dehydrated. Be sure to drink plenty of fluid after surgery. Report any persistent temperature above 100 degrees. WEAKNESS It is not unusual for a person who has had an anesthetic or any type of operation to feel weak, have palpitations, break out in \"cold sweats\", or get dizzy. This gradually clears up in a few days without medication. MEDICATION Almost all patients will be prescribed an antibiotic to be taken after surgery. Obtain explicit instructions about this medicine from the nurse. Multivitamins with vitamin C are suggested for the pre- and postoperative periods, and can be obtained by you without a prescription. We strongly discourage you from taking any Vitamin E preparations prior to or after surgery. These may increase the probability of bleeding.   If you develop a rash or other reaction while you are taking one of the medicines, this could mean that you are developing an allergy to the medicine. If this occurs, please stop taking your medications and call the office immediately. YOUR FIRST POSTOPERATIVE OFFICE VISIT The appointment for your first postop visit is usually made prior to surgery. This appointment will likely be for the day after surgery. The nose will be examined and cleaned, and the post-operative instructions will be reviewed. It is important that you try to eat or drink something before coming into the office the day after surgery. Ideally, this should be high in calories and protein, such as milk and cookies. If you don't feel up to this, at least a soft drink with high sugar content is advisable. POSTOPERATIVE CARE Following your surgery, we will want to see you in the office at regularly scheduled intervals to monitor your progress. RESUMING ACTIVITIES While the bandage is in place, don't wear any pull over clothing. AVOID STRENUOUS ATHLETIC ACTIVITY FOR ONE MONTH, including swimming, jogging, aerobics, etc.  No diving or water skiing for 2 months. No contact sports for 6 months. Avoid sneezing until the bandage is removed. If you must sneeze, let it come out like a cough - through the mouth. If it becomes a real problem, we will prescribe medication to alleviate the condition. Eyeglasses may be worn as long as the metal splint remains on the nose. After the splint is removed, glasses  must be suspended from the forehead for a period of about six weeks. If this is not done, the pressure of your glasses may change the contour of your nose. Your glasses can be suspended from the nose after your splint is removed in three ways. One way is to use a piece of tape to hold the glasses on your forehead so that the weight is off your nose. Contact lenses may be worn the day after surgery. RETURNING TO WORK OR SCHOOL The average patient is able to return to school the day after the bandage is removed. That will be about five to six days after your surgery. Some hardy souls have returned earlier. Returning to work depends on several factors: the amount of physical activity involved in your position, the amount of public contact your job requires, and the amount of swelling and discoloration that you may develop. On the average, you may return to work on the 8th to 10th postoperative day. INJURY TO THE NOSE Some individuals sustain accidents during the early postoperative period. You need not be too concerned unless the blow is hard enough to cause significant bleeding, swelling or pain. If a blow is sustained while the metal splint is still on, this should help protect the nose. However, for the first five weeks after the nasal splint is removed, more attention should be paid to any injury to the nose. Blows to the nose can cause the nasal bones to become deviated. Please report any accident to the office immediately if you feel it was a significant bump. Otherwise, let us know about it at your next visit. FINALLY Remember the things you were told before your operation: When the bandage is first removed, your nose may appear fat and turned up too much. This is caused by the operative swelling over the nose and in the upper lip. The swelling will subside to a great extent during the next week. However, remember that it will take up to one or two years for all the swelling to disappear and for your nose to reach its final contour. The discoloration will gradually disappear over a period of seven to ten days, in most cases. The thicker and oilier the skin, the longer it takes for the swelling to subside. The upper lip may seem stiff for some time after surgery, and you may feel that this interferes with your smile. Be patient. This will disappear within a few weeks. The tip of the nose sometimes feels numb after nasal surgery.   This will eventually disappear. Occasionally, the upper teeth will have tingling if extensive septal work was necessary. This, too, will resolve with time. Follow-up with Carolina Davila MD Monday 2/19/18 at 11:15am 
 
If you have any questions, please call us at [phone]. We are always happy to answer your questions, and if you should have a problem, this number is answered 24 hours a day. PATIENT INSTRUCTIONS: 
 
After general anesthesia or intravenous sedation, for 24 hours or while taking prescription Narcotics: · Limit your activities · Do not drive and operate hazardous machinery · Do not make important personal or business decisions · Do  not drink alcoholic beverages · If you have not urinated within 8 hours after discharge, please contact your surgeon on call. Report the following to your surgeon: 
· Excessive pain, swelling, redness or odor of or around the surgical area · Temperature over 100.5 · Nausea and vomiting lasting longer than 4 hours or if unable to take medications · Any signs of decreased circulation or nerve impairment to extremity: change in color, persistent  numbness, tingling, coldness or increase pain · Any questions [address] Breathing deep and coughing are very important exercises to do after surgery. Deep breathing and coughing open the little air tubes and air sacks in your lungs. You take deep breaths every day. You may not even notice - it is just something you do when you sigh or yawn. It is a natural exercise you do to keep these air passages open. After surgery, take deep breaths and cough, on purpose. Coughing and deep breathing help prevent bronchitis and pneumonia after surgery. If you had chest or belly surgery, use a pillow as a \"hug buddy\" and hold it tightly to your chest or belly when you cough. DIRECTIONS: 
6. Take 10 to 15 slow deep breaths every hour while awake. 7. Breathe in deeply, and hold it for 2 seconds. 8. Exhale slowly through puckered lips, like blowing up a balloon. 9. After every 4th or 5th deep breath, hug your pillow to your chest or belly and give a hard, deep cough. Yes, it will probably hurt. But doing this exercise is very important part of healing after surgery. Take your pain medicine to help you do this exercise without too much pain. IF YOU HAVE BEEN DIAGNOSED WITH SLEEP APNEA, PLEASE USE YOUR SLEEP APNEA DEVICE OR CPAP MACHINE WHEN YOU INTEND TO NAP AFTER TAKING PAIN MEDICATION. Ankle Pumps Ankle pumps increase the circulation of oxygenated blood to your lower extremities and decrease your risk for circulation problems such as blood clots. They also stretch the muscles, tendons and ligaments in your foot and ankle, and prevent joint contracture in the ankle and foot, especially after surgeries on the legs. It is important to do ankle pump exercises regularly after surgery because immobility increases your risk for developing a blood clot. Your doctor may also have you take an Aspirin for the next few days as well. If your doctor did not ask you to take an Aspirin, consult with him before starting Aspirin therapy on your own. Slowly point your foot forward, feeling the muscles on the top of your lower leg stretch, and hold this position for 5 seconds. Next, pull your foot back toward you as far as possible, stretching the calf muscles, and hold that position for 5 seconds. Repeat with the other foot. Perform 10 repetitions every hour while awake for both ankles if possible (down and then up with the foot once is one repetition). You should feel gentle stretching of the muscles in your lower leg when doing this exercise. If you feel pain, or your range of motion is limited, don't  Push too hard.   Only go the limit your joint and muscles will let you go. If you have increasing pain, progressively worsening leg warmth or swelling, STOP the exercise and call your doctor. Chart Review Routing History No Routing History on File

## 2018-02-14 NOTE — DISCHARGE INSTRUCTIONS
Patient Discharge Instructions    Donnie Cohen / [id number] : 1990    Admitted 2018 Discharged: 2018            Patient Discharge Instructions - Rhinoplasty     The following instructions have been designed to answer practically every question that might arise regarding the \"do's\" and \"don'ts\" after surgery. You and your family should read these several times to become familiar with them. Follow them faithfully, because those who do generally have the smoothest postoperative course. SWELLING  Every operation, no matter how minor, is accompanied by swelling of the surrounding tissues. The amount varies from person to person. The swelling itself is not serious and is to be expected after your surgery. It sometimes is worse on the second postoperative day than it was on the first, and in the mornings. Remember is that swelling will always subside eventually. You can help decrease the swelling in the following ways:    1. Sleep with your head elevated until all of the dressings have been removed from the nose. Use an additional pillow or two under the mattress, if necessary. 2.    Stay up (sitting, standing, walking about) as much as possible after your return home. THIS IS IMPORTANT. Of course, you should rest when you tire. 3.    Avoid bending over or lifting heavy things for one week. Besides aggravating the swelling, this may raise your blood pressure and start a hemorrhage. 4.    Avoid hitting or bumping your new nose. It is wise not to  small children. 5.    Avoid excessive sunning of the face during the first month after your operation. A sunscreen is always advisable, but a total sunblock is suggested for the first month. 6.    Do not tweeze your eyebrows for one week. 7.    Avoid \"sniffing\", that is, constantly attempting to pull air through the nose as some people do when their nose feels blocked.   This will not relieve the sensation of blockage - it will only aggravate it because the suction created on the inside will cause more swelling. 8.    Avoid rubbing the nostrils and the base of the nose with Kleenex or a handkerchief. Not only will this aggravate the swelling, but also it may cause infection, bleeding, or the accumulation of scar tissue inside the nose. Use the \"moustache\" gauze dressing instead if discharge is excessive. DISCOLORATION  It is not unusual to have varying amounts of discoloration about the face. Like swelling, the discoloration may become more pronounced after you have been discharged. It usually lasts not more than a week or two, all the while decreasing in intensity. If the nasal bones were not reshaped, there is usually very little bruising. The measures which will help your swelling subside will also be working to decrease the amount of discoloration. You can camouflage the discoloration to some extent by using a thick makeup base. NUMBNESS  After surgery you will notice that the tip of your nose feels firm, and it is not uncommon for the nose to feel numb for a short time. If you have incisions inside your nose, you may be able to feel minor irregularities in its surface until all swelling disappears. NASAL PACKING AND BLEEDING  It is not uncommon to soak several gauze pads (your moustache dressing) during the first several hours after surgery. The frequency with which these are changed should decrease. If it does not, go to bed, apply ice compresses about the face, and report it to the office by telephone. You may be told to return to the office or hospital.    Change the drip pad as needed using 4x4 gauze and tape. If the gauze becomes stuck to your nose, you can soften it with the nasal saline drops.       Whenever the nasal passages are blocked, such as when you have a cold or an allergy, the nasal glands produce more mucous than normal.  Your nose is blocked from the postoperative swelling, so you can expect an increase in mucous drainage for several days. It will be blood tinged and should cause you no concern unless the drainage looks like pure blood and flows freely, as when you cut a finger. If this happens, please call the office immediately. DO NOT attempt to remove blood clots or anything else from the nostrils. If a turbinate resection was part of your nasal procedure, bleeding can occur from this area for up to six weeks after your surgery. Be diligent in using the nasal drops and ointment. This helps the healing process and the dissolving of the crusts that form on the turbinates. Your exercise regimen will be curtailed at least to some extent for the first few weeks following surgery. Upper body exercise is especially prohibited, as it is more likely to cause turbinate bleeding. PAIN  There is usually little actual pain following nasal surgery, but you may experience a deep bruised sensation as a result of the postoperative swelling that occurs. As is usually the case with such things, this seems worse at night and when one becomes nervous. The usually prescribed drugs which minimize pain often cause sensations of light-headedness, particularly in the immediate postoperative period. This may seem to make your recovery more tedious. Please take the pain medicine as needed. Do not try to \"tough it out\" if you are uncomfortable. DO NOT take aspirin, ibuprofen, or any other NSAIDs (Non-Steroidal Anti-Inflammatory Drugs). NAUSEA  Sometimes the anesthesia, the pain pills, or swallowed blood will make you nauseated. You will receive a prescription for anti-nausea medication to use if needed. DEPRESSION  It is not unusual for an individual to go through a period of mild depression twelve to thirty-six hours after surgery.   Even though you very much want this surgery, and even though we have tried to tell you what to expect postoperatively, you may be somewhat shocked at seeing your own face swollen and bruised. This is a very temporary condition which will subside shortly. The best \"treatment\" is to busy yourself with the details of your postoperative care and try to remember that the recovery period will soon be over. INSOMNIA  You may experience some difficulty falling asleep. For this we have prescribed a sleeping pill. If you must take one, remember that such drugs make some people feel light-headed and weak. KEEPING A STIFF UPPER LIP  The upper lip is important in nasal surgery, as much work is done in this area. To keep the healing tissues from being disturbed, do not move your upper lip for as long as the bandage is in place. Avoid pursing the lips such as in kissing for ten days. Do not pull the upper lip down as women do when applying lipstick. Apply lipstick with a brush. Be careful not to manipulate the upper lip excessively when brushing your teeth. Avoid gum or foods that are hard to chew. Soups, mashed potatoes, stewed chicken, hamburger steak, or any easily chewed food is permissible. CLEANING THE NOSE  Don't blow the nose at all for ten days. After that, blow through both sides at once. Do not compress one side. The outside and near inside of the nostrils may need to be cleaned with a Q-tip moistened with warm water or hydrogen peroxide if crusting is present. The antibiotic ointment that you will be prescribed, usually Bacitracin, should be applied to the inside of the nose with a Q-tip 3 to 4 times a day. Twist the Q-tip around inside gently; you can go in about an inch or until you feel any resistance. This will help prevent crusting and help you to breathe better. You were also prescribed a saline spray. Apply a few sprays on each side every few hours while awake. DRYNESS OF THE LIPS  If your lips become dry from breathing through your mouth, coat them with Vaseline or lipstick.   A vaporizer with plain water by the bedside at night might be a helpful addition. TEMPERATURE  Generally, the body temperature does not rise much above 100 degrees following nasal surgery. This rise usually occurs if the patient becomes slightly dehydrated. Be sure to drink plenty of fluid after surgery. Report any persistent temperature above 100 degrees. WEAKNESS  It is not unusual for a person who has had an anesthetic or any type of operation to feel weak, have palpitations, break out in \"cold sweats\", or get dizzy. This gradually clears up in a few days without medication. MEDICATION  Almost all patients will be prescribed an antibiotic to be taken after surgery. Obtain explicit instructions about this medicine from the nurse. Multivitamins with vitamin C are suggested for the pre- and postoperative periods, and can be obtained by you without a prescription. We strongly discourage you from taking any Vitamin E preparations prior to or after surgery. These may increase the probability of bleeding. If you develop a rash or other reaction while you are taking one of the medicines, this could mean that you are developing an allergy to the medicine. If this occurs, please stop taking your medications and call the office immediately. YOUR FIRST POSTOPERATIVE OFFICE VISIT  The appointment for your first postop visit is usually made prior to surgery. This appointment will likely be for the day after surgery. The nose will be examined and cleaned, and the post-operative instructions will be reviewed. It is important that you try to eat or drink something before coming into the office the day after surgery. Ideally, this should be high in calories and protein, such as milk and cookies. If you don't feel up to this, at least a soft drink with high sugar content is advisable. POSTOPERATIVE CARE  Following your surgery, we will want to see you in the office at regularly scheduled intervals to monitor your progress.     RESUMING ACTIVITIES  While the bandage is in place, don't wear any pull over clothing. AVOID STRENUOUS ATHLETIC ACTIVITY FOR ONE MONTH, including swimming, jogging, aerobics, etc.  No diving or water skiing for 2 months. No contact sports for 6 months. Avoid sneezing until the bandage is removed. If you must sneeze, let it come out like a cough - through the mouth. If it becomes a real problem, we will prescribe medication to alleviate the condition. Eyeglasses may be worn as long as the metal splint remains on the nose. After the splint is removed, glasses  must be suspended from the forehead for a period of about six weeks. If this is not done, the pressure of your glasses may change the contour of your nose. Your glasses can be suspended from the nose after your splint is removed in three ways. One way is to use a piece of tape to hold the glasses on your forehead so that the weight is off your nose. Contact lenses may be worn the day after surgery. RETURNING TO WORK OR SCHOOL  The average patient is able to return to school the day after the bandage is removed. That will be about five to six days after your surgery. Some hardy souls have returned earlier. Returning to work depends on several factors: the amount of physical activity involved in your position, the amount of public contact your job requires, and the amount of swelling and discoloration that you may develop. On the average, you may return to work on the 8th to 10th postoperative day. INJURY TO THE NOSE  Some individuals sustain accidents during the early postoperative period. You need not be too concerned unless the blow is hard enough to cause significant bleeding, swelling or pain. If a blow is sustained while the metal splint is still on, this should help protect the nose. However, for the first five weeks after the nasal splint is removed, more attention should be paid to any injury to the nose.   Blows to the nose can cause the nasal bones to become deviated. Please report any accident to the office immediately if you feel it was a significant bump. Otherwise, let us know about it at your next visit. FINALLY  Remember the things you were told before your operation:    When the bandage is first removed, your nose may appear fat and turned up too much. This is caused by the operative swelling over the nose and in the upper lip. The swelling will subside to a great extent during the next week. However, remember that it will take up to one or two years for all the swelling to disappear and for your nose to reach its final contour. The discoloration will gradually disappear over a period of seven to ten days, in most cases. The thicker and oilier the skin, the longer it takes for the swelling to subside. The upper lip may seem stiff for some time after surgery, and you may feel that this interferes with your smile. Be patient. This will disappear within a few weeks. The tip of the nose sometimes feels numb after nasal surgery. This will eventually disappear. Occasionally, the upper teeth will have tingling if extensive septal work was necessary. This, too, will resolve with time. Follow-up with Steph Alvarado MD Monday 2/19/18 at 11:15am    If you have any questions, please call us at [phone]. We are always happy to answer your questions, and if you should have a problem, this number is answered 24 hours a day. PATIENT INSTRUCTIONS:    After general anesthesia or intravenous sedation, for 24 hours or while taking prescription Narcotics:  · Limit your activities  · Do not drive and operate hazardous machinery  · Do not make important personal or business decisions  · Do  not drink alcoholic beverages  · If you have not urinated within 8 hours after discharge, please contact your surgeon on call.     Report the following to your surgeon:  · Excessive pain, swelling, redness or odor of or around the surgical area  · Temperature over 100.5  · Nausea and vomiting lasting longer than 4 hours or if unable to take medications  · Any signs of decreased circulation or nerve impairment to extremity: change in color, persistent  numbness, tingling, coldness or increase pain  · Any questions    COUGH AND DEEP BREATHE    Breathing deep and coughing are very important exercises to do after surgery. Deep breathing and coughing open the little air tubes and air sacks in your lungs. You take deep breaths every day. You may not even notice - it is just something you do when you sigh or yawn. It is a natural exercise you do to keep these air passages open. After surgery, take deep breaths and cough, on purpose. Coughing and deep breathing help prevent bronchitis and pneumonia after surgery. If you had chest or belly surgery, use a pillow as a \"hug garland\" and hold it tightly to your chest or belly when you cough. DIRECTIONS:  6. Take 10 to 15 slow deep breaths every hour while awake. 7. Breathe in deeply, and hold it for 2 seconds. 8. Exhale slowly through puckered lips, like blowing up a balloon. 9. After every 4th or 5th deep breath, hug your pillow to your chest or belly and give a hard, deep cough. Yes, it will probably hurt. But doing this exercise is very important part of healing after surgery. Take your pain medicine to help you do this exercise without too much pain. IF YOU HAVE BEEN DIAGNOSED WITH SLEEP APNEA, PLEASE USE YOUR SLEEP APNEA DEVICE OR CPAP MACHINE WHEN YOU INTEND TO NAP AFTER TAKING PAIN MEDICATION. Ankle Pumps    Ankle pumps increase the circulation of oxygenated blood to your lower extremities and decrease your risk for circulation problems such as blood clots. They also stretch the muscles, tendons and ligaments in your foot and ankle, and prevent joint contracture in the ankle and foot, especially after surgeries on the legs.     It is important to do ankle pump exercises regularly after surgery because immobility increases your risk for developing a blood clot. Your doctor may also have you take an Aspirin for the next few days as well. If your doctor did not ask you to take an Aspirin, consult with him before starting Aspirin therapy on your own. Slowly point your foot forward, feeling the muscles on the top of your lower leg stretch, and hold this position for 5 seconds. Next, pull your foot back toward you as far as possible, stretching the calf muscles, and hold that position for 5 seconds. Repeat with the other foot. Perform 10 repetitions every hour while awake for both ankles if possible (down and then up with the foot once is one repetition). You should feel gentle stretching of the muscles in your lower leg when doing this exercise. If you feel pain, or your range of motion is limited, don't  Push too hard. Only go the limit your joint and muscles will let you go. If you have increasing pain, progressively worsening leg warmth or swelling, STOP the exercise and call your doctor.

## 2018-02-14 NOTE — BRIEF OP NOTE
BRIEF OPERATIVE NOTE    Date of Procedure: 2/14/2018   Preoperative Diagnosis: COSMETIC   Postoperative Diagnosis: COSMETIC     Procedure(s):  NASAL SCAR REVISION COSMETIC  Surgeon(s) and Role:     * Rajwinder Josue MD - Primary         Assistant Staff: None      Surgical Staff:  Circ-1: Jestine Severe, RN  Circ-Intern: Zoila Dance  Scrub Tech-1: Christina Montes  Event Time In   Incision Start 0758   Incision Close 0844     Anesthesia: General   Estimated Blood Loss: min  Specimens: * No specimens in log *   Findings: cosmetic   Complications: none  Implants: * No implants in log *

## 2018-02-14 NOTE — H&P
Otolaryngology, Head and Neck Surgery    Chief Complaint:    History of Present Illness:   Patient is a 32 y.o. female who is being seen for minor nasal scar revision. Underwent rhinoplasty last year. Has slight tip irregularity. History reviewed. No pertinent past medical history. History reviewed. No pertinent family history. Social History   Substance Use Topics    Smoking status: Never Smoker    Smokeless tobacco: Never Used    Alcohol use Yes      Comment: wine or beer 2 times monthly     Past Surgical History:   Procedure Laterality Date    HX HEENT      wisdom teeth extraction    HX RHINOPLASTY  2018      Current Facility-Administered Medications   Medication Dose Route Frequency    lidocaine (PF) (XYLOCAINE) 10 mg/mL (1 %) injection 0.1 mL  0.1 mL SubCUTAneous PRN    lactated Ringers infusion  125 mL/hr IntraVENous CONTINUOUS    sodium chloride (NS) flush 5-10 mL  5-10 mL IntraVENous Q8H    sodium chloride (NS) flush 5-10 mL  5-10 mL IntraVENous PRN    naloxone (NARCAN) injection 0.2 mg  0.2 mg IntraVENous EVERY 2 MINUTES AS NEEDED    flumazenil (ROMAZICON) 0.1 mg/mL injection 0.2 mg  0.2 mg IntraVENous Multiple      No Known Allergies     Review of Systems:  Pertinent items are noted in the History of Present Illness. Objective:     Patient Vitals for the past 8 hrs:   BP Temp Pulse Resp SpO2 Weight   18 0610 116/60 98 °F (36.7 °C) 85 12 98 % 56.4 kg (124 lb 5.4 oz)     Temp (24hrs), Av °F (36.7 °C), Min:98 °F (36.7 °C), Max:98 °F (36.7 °C)         PHYSICAL EXAM:    CONSTITUTIONAL:  Well nourished individual in no acute distress. The patient is able to communicate with normal voice quality. HEAD AND NECK EXAM:    HEAD & FACE: No head or facial abnormalities present. No masses or lesions present. Overall appearance is normal. Facial strength is normal.  EYES: Conjunctiva normal.  No abnormalities of the lids or globes.  Extraocular movements intact and conjugate. EARS: No significant abnormality of the external ear. NOSE: No significant external nasal lesions. No turbinate hypertrophy or mucosal lesions. No polyps, masses or purulence seen anteriorly. No edema. No significant septal deviation. SINUSES: The paranasal sinus regions are non-tender to palpation. ORAL CAVITY/OROPHARYNX: Lips and gums are without lesions. Oral cavity and oropharynx are without mucosal lesions or abnormalities. Tonsillar fossae, palate, tongue and uvula without abnormality. No edema. No erythema. NECK: No palpable lymphadenopathy or other masses in the neck. The trachea and larynx are midline. No thyroid enlargement or nodules appreciated. No abnormality of the parotid or submandibular glands present. LYMPHATIC: No lymphadenopathy in the neck/head. CHEST:  CTA  HEART:  RRR  NEUROLOGIC/PSYCHIATRIC:    NEUROLOGIC:  Cranial nerves 3, 4, 5, 6, 7, 10 (soft palate elevation), 11 and 12 bilaterally intact and symmetric. ORIENTATION/MOOD/AFFECT: Oriented to person, place, time and general circumstances. Mood and affect appropriate. Assessment:     Minor nasal scar irregularity    Plan:     Ready to proceed with minor nasal scar revision. Questions answered. Consent signed. Signed By: Khadar Vazquez MD     February 14, 2018       Jean Marie Luna MD  [facility], [facility] [employer] [facility]  [email or web address] StoresLarkin Community [facility]. [employer]  [email or web address]  [address] [place] [address], [address] [facility],[address]  Ph:  [phone]  Fax: [phone]

## 2018-02-14 NOTE — ANESTHESIA PREPROCEDURE EVALUATION
Anesthetic History   No history of anesthetic complications            Review of Systems / Medical History  Patient summary reviewed and nursing notes reviewed    Pulmonary  Within defined limits                 Neuro/Psych   Within defined limits           Cardiovascular  Within defined limits                Exercise tolerance: >4 METS     GI/Hepatic/Renal  Within defined limits              Endo/Other  Within defined limits           Other Findings              Physical Exam    Airway  Mallampati: II    Neck ROM: normal range of motion   Mouth opening: Normal     Cardiovascular    Rhythm: regular  Rate: normal         Dental  No notable dental hx       Pulmonary  Breath sounds clear to auscultation               Abdominal         Other Findings            Anesthetic Plan    ASA: 1  Anesthesia type: general          Induction: Intravenous  Anesthetic plan and risks discussed with: Patient and Spouse      Informed consent obtained.

## 2018-02-14 NOTE — ANESTHESIA POSTPROCEDURE EVALUATION
Post-Anesthesia Evaluation and Assessment    Patient: Malena Lux MRN: [id number]  SSN: [id number]    YOB: 1990  Age: 32 y.o. Sex: female       Cardiovascular Function/Vital Signs  Visit Vitals    /64    Pulse 73    Temp 36.6 °C (97.8 °F)    Resp 12    Ht 5' 6\" (1.676 m)    Wt 56.4 kg (124 lb 5.4 oz)    SpO2 96%    Breastfeeding Unknown    BMI 20.07 kg/m2       Patient is status post general anesthesia for Procedure(s):  NASAL SCAR REVISION . Nausea/Vomiting: None    Postoperative hydration reviewed and adequate. Pain:  Pain Scale 1: Numeric (0 - 10) (02/14/18 0925)  Pain Intensity 1: 5 (02/14/18 0925)   Managed    Neurological Status:   Neuro (WDL): Within Defined Limits (02/14/18 0855)  Neuro  LUE Motor Response: Moderate (02/14/18 0855)  LLE Motor Response: Moderate (02/14/18 0855)  RUE Motor Response: Moderate (02/14/18 0855)  RLE Motor Response: Moderate (02/14/18 0855)   At baseline    Mental Status and Level of Consciousness: Alert and oriented     Pulmonary Status:   O2 Device: Room air (02/14/18 0855)   Adequate oxygenation and airway patent    Complications related to anesthesia: None    Post-anesthesia assessment completed.  No concerns    Signed By: Ceci Feliciano DO     February 14, 2018

## 2018-02-14 NOTE — OP NOTES
Alvino Moreno [facility] 77  OPERATIVE REPORT    Xiomy Garcia  MR#: [id number]  : 1990  ACCOUNT #: [de-identified]   DATE OF SERVICE: 2018    ATTENDING SURGEON:  Mirella Bey MD     PREOPERATIVE DIAGNOSIS:  Minor nasal scar. POSTOPERATIVE DIAGNOSIS:  Minor nasal scar. PROCEDURE:  Minor nasal scar revision. ANESTHESIA:  General.    COMPLICATIONS:  None. ESTIMATED BLOOD LOSS:  Minimal.    SPECIMENS REMOVED:  None. FINDINGS:  There was slight asymmetry to the tip from some scarring around previous grafts. There was a slight irregularity to the dorsum on the left upper and slight excess height to the cartilaginous dorsum distally. INDICATIONS FOR PROCEDURE:  The patient is a 49-year-old female who had previously underwent cosmetic rhinoplasty. She developed some asymmetric scarring in the tip around some grafts as well as a slight irregularity to the dorsum as described. PROCEDURE IN DETAIL:  After informed consent, the patient was taken to the operating room and placed on the table in the supine position. After general anesthesia, the table was turned 180 degrees. The nose was packed with Afrin pledgets and injected with 1% lidocaine with 1:100,000 epinephrine. The patient was prepped and draped in a standard fashion. Initially, a right marginal incision over the columella and extending just under the dome was made. This is about 1 cm long. Scissors were introduced through the incision and the skin was gently elevated from the previously placed graft. The grafts were visualized and then trimmed and repositioned to the right slightly and some cartilage was actually removed and then replaced slightly to the right. The symmetry was much improved at this point. Note also, the tip was undermined laterally on both sides to help to redistribute the skin over the graft somewhat better. The incision was then closed with 5-0 plain sutures.       At this point, a left intercartilaginous incision was made to access the dorsum. Scissors were used to develop a pocket over the dorsum extending up to the nasal bones. A rasp was used to rasp the irregularity on the left side, which is likely cartilaginous but some cartilage fragments were suctioned from this area and by palpation, the area seemed to be flatter. There was slight fullness of the distal cartilaginous dorsum and this was trimmed of about 1 mm with the 15 blade. This pocket was irrigated with bacitracin and saline. No bleeding was seen at this point. The dorsum was smooth to palpation at this point. The intercartilaginous incision was closed with 5-0 plain sutures and this concluded the procedure. The nose was suctioned as was the nasopharynx. An external splint composed of brown tape and a metal splint was placed in the usual fashion. Pledgets were placed on both sides and strings taped to the face. The patient was then awakened from anesthesia, extubated, and taken to the PACU in stable condition. There were no complications. The patient tolerated the procedure well.       MD LIANG You / RN  D: 02/14/2018 08:58     T: 02/14/2018 10:02  JOB #: [id number]

## 2018-10-19 ENCOUNTER — OP HISTORICAL/CONVERTED ENCOUNTER (OUTPATIENT)
Dept: OTHER | Age: 28
End: 2018-10-19

## 2018-10-28 ENCOUNTER — IP HISTORICAL/CONVERTED ENCOUNTER (OUTPATIENT)
Dept: OTHER | Age: 28
End: 2018-10-28

## (undated) DEVICE — SUT PLN 4-0 18IN SC1 DA YEL --

## (undated) DEVICE — ROCKER SWITCH PENCIL BLADE ELECTRODE, HOLSTER: Brand: EDGE

## (undated) DEVICE — INFECTION CONTROL KIT SYS

## (undated) DEVICE — SUTURE PDS II SZ 6-0 L18IN ABSRB UD PC-1 L13MM 3/8 CIR PRIM Z833G

## (undated) DEVICE — DEVON™ KNEE AND BODY STRAP 60" X 3" (1.5 M X 7.6 CM): Brand: DEVON

## (undated) DEVICE — STERILE POLYISOPRENE POWDER-FREE SURGICAL GLOVES: Brand: PROTEXIS

## (undated) DEVICE — COVER LT HNDL BLU PLAS

## (undated) DEVICE — BIPOLAR FORCEPS CORD,BANANA LEADS: Brand: VALLEYLAB

## (undated) DEVICE — 3M™ MICROPORE™ TAPE ¸INCH X 10 YARDS, TAN, 24 ROLLS/CARTON, 10 CARTONS/CASE, 1533-0: Brand: 3M™ MICROPORE™

## (undated) DEVICE — ELECTRODE 8227410 PAIRED 2 CH SET ROHS

## (undated) DEVICE — PACK PROCEDURE SURG ENT CUST

## (undated) DEVICE — ARGYLE FRAZIER SURGICAL SUCTION INSTRUMENT 10 FR/CH (3.3 MM): Brand: ARGYLE

## (undated) DEVICE — REM POLYHESIVE ADULT PATIENT RETURN ELECTRODE: Brand: VALLEYLAB

## (undated) DEVICE — SUT PLN 5-0 18IN P3 YEL --

## (undated) DEVICE — ELECTRODE NDL L2CM TUNGSTEN MICSURG STR IMPROVED CUT AND

## (undated) DEVICE — SOL IRRIGATION INJ NACL 0.9% 500ML BTL

## (undated) DEVICE — 3000CC GUARDIAN II: Brand: GUARDIAN

## (undated) DEVICE — INSULATED BLADE ELECTRODE: Brand: EDGE

## (undated) DEVICE — DRAPE SHT 3 QTR PROXIMA 53X77 --

## (undated) DEVICE — MASTISOL ADHESIVE LIQ 2/3ML

## (undated) DEVICE — SOLUTION IV 500ML 0.9% SOD CHL FLX CONT

## (undated) DEVICE — SYR IRR BLB 2OZ DISP BLU STRL -- CONVERT TO ITEM 357637

## (undated) DEVICE — KENDALL SCD EXPRESS SLEEVES, KNEE LENGTH, MEDIUM: Brand: KENDALL SCD

## (undated) DEVICE — SUTURE PDS II SZ 5-0 L18IN ABSRB UD P-3 L13MM 3/8 CIR PRIM Z493G

## (undated) DEVICE — Z DISCONTINUEDSOLUTION PREP 2OZ 10% POVIDONE IOD SCR CAP BTL

## (undated) DEVICE — APPLICATOR FBR TIP L6IN COT TIP WOOD SHFT SWAB 2000 PER CA